# Patient Record
Sex: FEMALE | Race: WHITE | NOT HISPANIC OR LATINO | Employment: FULL TIME | URBAN - METROPOLITAN AREA
[De-identification: names, ages, dates, MRNs, and addresses within clinical notes are randomized per-mention and may not be internally consistent; named-entity substitution may affect disease eponyms.]

---

## 2022-12-12 ENCOUNTER — OFFICE VISIT (OUTPATIENT)
Dept: PSYCHIATRY | Facility: CLINIC | Age: 42
End: 2022-12-12

## 2022-12-12 VITALS
WEIGHT: 248 LBS | DIASTOLIC BLOOD PRESSURE: 76 MMHG | BODY MASS INDEX: 38.92 KG/M2 | SYSTOLIC BLOOD PRESSURE: 148 MMHG | HEIGHT: 67 IN

## 2022-12-12 DIAGNOSIS — F41.1 GAD (GENERALIZED ANXIETY DISORDER): ICD-10-CM

## 2022-12-12 DIAGNOSIS — F13.20 BENZODIAZEPINE DEPENDENCE: ICD-10-CM

## 2022-12-12 DIAGNOSIS — F41.0 PANIC DISORDER: ICD-10-CM

## 2022-12-12 DIAGNOSIS — F33.0 RECURRENT DEPRESSIVE DISORDER, CURRENT EPISODE MILD: Primary | ICD-10-CM

## 2022-12-12 PROCEDURE — 90792 PSYCH DIAG EVAL W/MED SRVCS: CPT | Performed by: PSYCHIATRY & NEUROLOGY

## 2022-12-12 RX ORDER — CLONAZEPAM 1 MG/1
1 TABLET ORAL 2 TIMES DAILY
COMMUNITY
Start: 2022-11-27 | End: 2022-12-12 | Stop reason: SDUPTHER

## 2022-12-12 RX ORDER — SERTRALINE HYDROCHLORIDE 100 MG/1
100 TABLET, FILM COATED ORAL NIGHTLY
Qty: 30 TABLET | Refills: 0 | Status: SHIPPED | OUTPATIENT
Start: 2022-12-12 | End: 2023-01-11

## 2022-12-12 RX ORDER — CLONAZEPAM 1 MG/1
1 TABLET ORAL 2 TIMES DAILY
Qty: 60 TABLET | Refills: 0 | Status: SHIPPED | OUTPATIENT
Start: 2022-12-27 | End: 2022-12-21 | Stop reason: SDUPTHER

## 2022-12-12 RX ORDER — SERTRALINE HYDROCHLORIDE 100 MG/1
TABLET, FILM COATED ORAL
COMMUNITY
Start: 2022-09-21 | End: 2022-12-12 | Stop reason: SDUPTHER

## 2022-12-12 NOTE — PROGRESS NOTES
"[facility]  Initial Evaluation    Referring Provider:  Provider, No Known  [place] [address]    Chief Complaint: \"I just moved here and I was under the care of someone else and they stopped practicing in the three months that I lived here.\"    History of Present Illness: Cherise Roque is a 42 y.o. female who presents today for initial evaluation regarding ongoing anxiety and panic in the setting of iatrogenic benzodiazepine dependence and a recent move to [place] from [place]. She presents unaccompanied in no acute distress and engages with me appropriately. Psychotropic regimen with which patient presents is described as partially effective.     History is positive for signs/symptoms suggestive of MARTA, panic: consistent and excessive worry across several domains of life that contributes to tension and irritability throughout the day, recurrent episodes of intense, unprovoked anxiety with chest pain, feelings of doom, dizziness, paresthesias, SOB, and associated avoidance 2/2 fear of recurrence of panic. Has panic episodes probably a couple of times a day, struggles with being in vehicles as either a  or a passenger. Feels panic disorder led to end in her marriage - this has limited her functionality in several regards, though she has been able to maintain a job; divorce was three years ago. Daughter's recurrent suicidal gestures and attempts have contributed to patient's panic and also have contributed to guilt - once tried to OD on patient's medications left in a purse. Things had been better until recently when her daughter moved in from [place], [place] - she feels the two of them have a toxic relationship and that this has been a big strain on the patient. She had been enjoying her independence and self-reliance without external factors detracting at home and this has changed. The patient's daughter is cycling through jobs and per the patient doesn't " have good work ethic or drive and this is frustrating for the patient. Moved in 09/2022 away from family for work and is proud of herself for doing this and being independent in this fashion.    I have counseled the patient with regard to diagnoses and the recommended treatment regimen as documented below: I will assume prescriptive responsibility for clonazepam and sertraline today. Patient does not take sertraline daily as prescribed and often misses doses; I have counseled her some about this today and she agrees to try and take it more regularly to assess for effect. Patient has been advised that long-term use of clonazepam can foster tachyphylaxis, dependence, and severe/life-threatening withdrawal with abrupt discontinuation - ideally this agent will be tapered and/or discontinued over time with greater stability. Patient has been advised that this agent can contribute to falls, confusion, sedation, and stunted psychological convalescence. We have discussed at length that she would likely benefit most from a combination of medication and therapy, but the patient does remain reluctant to engage a therapist at this time given her past experiences was less than helpful. Patient acknowledges the diagnoses per my rendered interpretation. Patient demonstrates awareness/understanding of viable alternatives for treatment as well as potential risks, benefits, and side effects associated with this regimen and is amenable to proceed in this fashion.     Recommended lifestyle changes: treat panic as a misunderstood child or a troubled friend rather than a monster, demon, or enemy that it can feel to be; begin addressing a need for control and exploring why control became an adaptation for her in life due to past circumstances, and mitigate avoidance behaviors that contribute to the fear-anxiety cycle that has been ongoing for years in her case.    Psychiatric History:  Diagnoses: anxiety, panic, depression  Outpatient  "history: most recently with a Aletha Sehlby at [facility]  Inpatient history: twice; most recently 1998 (panic was debilitating, didn't know what to do - found out she was pregnant)  Medication trials: paroxetine, bupropion, escitalopram, duloxetine, others; no issues with SE in the past, but instead diminished effectiveness  Other treatment modalities: has done therapy in the past  Current regimen: sertraline 100 mg, clonazepam 1 mg BID  Self harm: denies  Suicide attempts: denies    Substance Abuse History:   Types/methods/frequency: substances in high school as below, not since; stopped after discovering she was pregnant    Social History:  Residence: lives in an apartment with her daughter (19 YO) - daughter just recently moved in and there is conflict; patient moved from [place] 09/05/2022 due to work transfer; divorce was three years ago  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[employer]; teacher in the past  Education: AD in early childhood education  Pertinent developmental history: denies; no abuse hx  Pertinent legal history: denies  Hobbies/interests: reading  Hindu: Shinto by ethnicity, doesn't like organized [employer]; has enjoyed sitting in [employer] in the past  Exercise: \"not on purpose\" - on her feet nearly all day at work, walks 7 miles on an average shift  Dietary habits: convenience, often eats junk foods  Sleep hygiene: work is prohibitive to routine  Social habits: no pertinent issues  Sunlight: no concern for under-exposure  Caffeine intake: 3-4 cups of coffee daily  Hydration habits: no pertinent issues   history: denies    Social History     Socioeconomic History   • Marital status:    Tobacco Use   • Smoking status: Every Day     Packs/day: 1.00     Years: 26.00     Pack years: 26.00     Types: Cigarettes   • Smokeless tobacco: Never   Vaping Use   • Vaping Use: Never used   Substance and Sexual Activity   • Alcohol use: Never   • Drug use: Not Currently     Types: Marijuana, " Cocaine(coke), LSD, MDMA (ecstacy), Opium, Psilocybin     Comment: Experimented in high school   • Sexual activity: Yes     Partners: Male     Birth control/protection: None     Access to Firearms: denies    Tobacco use counseling/intervention: patient was counseled with regard to risks of tobacco use and encouraged to consider quitting, with or without the use of adjunctive medication, by first setting a prospective quit date. Currently precontemplative by transtheoretical model.     PHQ-9 Depression Screening  PHQ-9 Total Score: 7    Little interest or pleasure in doing things? 1-->several days   Feeling down, depressed, or hopeless? 1-->several days   Trouble falling or staying asleep, or sleeping too much? 1-->several days   Feeling tired or having little energy? 1-->several days   Poor appetite or overeating? 1-->several days   Feeling bad about yourself - or that you are a failure or have let yourself or your family down? 1-->several days   Trouble concentrating on things, such as reading the newspaper or watching television? 0-->not at all   Moving or speaking so slowly that other people could have noticed? Or the opposite - being so fidgety or restless that you have been moving around a lot more than usual? 0-->not at all   Thoughts that you would be better off dead, or of hurting yourself in some way? 1-->several days   PHQ-9 Total Score 7     MARTA-7  Feeling nervous, anxious or on edge: Several days  Not being able to stop or control worrying: Several days  Worrying too much about different things: Several days  Trouble Relaxing: Several days  Being so restless that it is hard to sit still: Not at all  Feeling afraid as if something awful might happen: Several days  Becoming easily annoyed or irritable: Several days  MARTA 7 Total Score: 6  If you checked any problems, how difficult have these problems made it for you to do your work, take care of things at home, or get along with other people: Somewhat  difficult    Problem List:  Patient Active Problem List   Diagnosis   • Recurrent depressive disorder, current episode mild (HCC)   • MARTA (generalized anxiety disorder)   • Panic disorder     Allergy:   No Known Allergies     Discontinued Medications:  Medications Discontinued During This Encounter   Medication Reason   • clonazePAM (KlonoPIN) 1 MG tablet Reorder   • sertraline (ZOLOFT) 100 MG tablet Reorder       Current Medications:   Current Outpatient Medications   Medication Sig Dispense Refill   • [START ON 12/27/2022] clonazePAM (KlonoPIN) 1 MG tablet Take 1 tablet by mouth 2 (Two) Times a Day for 30 days. 60 tablet 0   • sertraline (ZOLOFT) 100 MG tablet Take 1 tablet by mouth Every Night for 30 days. 30 tablet 0     No current facility-administered medications for this visit.     Past Medical History:  Past Medical History:   Diagnosis Date   • Anxiety 1997   • Depression 1997   • Panic disorder 1997     Past Surgical History:  Past Surgical History:   Procedure Laterality Date   • NO PAST SURGERIES       Family History:   Family History   Problem Relation Age of Onset   • Drug abuse Father    • Alcohol abuse Father    • Alcohol abuse Paternal Aunt    • Alcohol abuse Paternal Uncle    • Paranoid behavior Maternal Grandfather    • Anxiety disorder Maternal Grandfather    • Dementia Maternal Grandmother    • Alcohol abuse Maternal Grandmother    • Alcohol abuse Paternal Grandfather    • Seizures Cousin    • Paranoid behavior Other    • Schizophrenia Other    • OCD Other    • Suicide Attempts Daughter    • Self-Injurious Behavior  Daughter    • Depression Daughter      Mental Status Exam:   Appearance: well-groomed, sits upright, age-appropriate, above average habitus  Behavior: calm, cooperative, appropriate in demeanor, appropriate eye-contact  Mood/affect: fair / mood-congruent and appropriate in both range and amplitude  Speech: within expected variance; appropriate rate, appropriate rhythm, appropriate  "tone; non-pressured  Thought Process: linear, goal-directed; no FOI or JOAN; abstraction intact  Thought Content: coherent, devoid of overt delusions/perceptual disturbances  SI/HI: denies both SI and HI; exhibits future-orientation, self-advocates appropriately, no regular self-harm, no appreciable intent  Memory: no overt deficits  Orientation: oriented to person/place/time/situation  Concentration: appropriate during interview  Intellectual capacity: presumptively average  Insight: fair by given history/exam  Judgment: appropriate by given history/exam  Psychomotor: no appreciable latency/retardation/agitation/tremor  Gait: WNL    Review of Systems:  Review of Systems   Constitutional: Negative for activity change, appetite change and unexpected weight change.   HENT: Negative for drooling.    Eyes: Negative for visual disturbance.   Respiratory: Negative for chest tightness and shortness of breath.    Cardiovascular: Negative for chest pain and palpitations.   Gastrointestinal: Negative for abdominal pain, diarrhea and nausea.   Endocrine: Negative for cold intolerance and heat intolerance.   Genitourinary: Negative for difficulty urinating and frequency.   Musculoskeletal: Negative for myalgias and neck stiffness.   Skin: Negative for rash.   Neurological: Negative for dizziness, tremors, seizures and light-headedness.      Vital Signs:   /76   Ht 168.9 cm (66.5\")   Wt 112 kg (248 lb)   BMI 39.43 kg/m²      Lab Results:   No results found for any previous visit.     EKG Results:  No orders to display     Imaging Results:  No Images in the past 120 days found.    ASSESSMENT AND PLAN:    ICD-10-CM ICD-9-CM   1. Recurrent depressive disorder, current episode mild (HCC)  F33.0 296.31   2. MARTA (generalized anxiety disorder)  F41.1 300.02   3. Panic disorder  F41.0 300.01   4. Benzodiazepine dependence (HCC)  F13.20 304.10     42 y.o. female who presents today for initial evaluation regarding ongoing anxiety " and panic in the setting of iatrogenic benzodiazepine dependence and a recent move to [place] from [place]. We have discussed the history and interpreted diagnoses as above as well as the treatment plan below, including potential R/B/SE of the recommended regimen of which the patient demonstrates understanding. Patient is agreeable to call 911 or go to the nearest ER should she become concerned for her own safety and/or the safety of those around her. There are no overt indices of acute jeanna/psychosis on evaluation today. There are no medication side effects or related complaints today. JOSE reviewed and as expected.    1. Medication regimen: continue clonazepam and sertraline without change for the time being; patient is advised not to misuse prescribed medications or to use any exogenous substances that aren't disclosed to this provider as they may interact with the regimen to her detriment.   2. Risk Assessment: protracted risk is low, imminent risk is low.  Risk factors include: anxiety disorder, mood disorder, and recent/ongoing psychosocial stressors. Protective factors include: intact reality testing, no substance use disorder, no access to firearms, no present SI, no stated history of suicide attempts or self-harm, patient's exhibited future-orientation, strong social support, and patient's cooperation with care. Do note that this is subject to change with the Yarsani of new stressors, treatment non-adherence, use of substances, and/or new medical ails.  3. Monitoring: PHQ-9 today is 7/27, MARTA-7 today is 6/21  4. Therapy: defer 2/2 patient preference  5. Follow-up: 4 weeks  6. Communications: N/A    TREATMENT PLAN/GOALS: challenge patterns of living conducive to symptom burden, implement recommended regimen as above with augmentative, intermittent supportive psychotherapy to reduce symptom burden. Patient acknowledged and verbally consented to begin treatment as above. The importance of adherence to the recommended  treatment and interval follow-up appointments was emphasized today. Patient was today advised to limit daily caffeine intake, hydrate appropriately, eat healthy and nutritious foods, engage sleep hygiene measures, engage appropriate exposure to sunlight, engage with hobbies in balance with life necessities, and exercise appropriate to their capacity to do so.     Billing: I have seen the patient today and considered her psychiatric complaints, rendered a diagnosis, and discussed treatment with the patient as above with which she consents.    Parts of this note are electronic transcriptions/translations of spoken language to printed text using the Dragon Dictation system.    Electronically signed by Wang Osman MD, 12/12/22, 0260

## 2022-12-12 NOTE — TREATMENT PLAN
Multi-Disciplinary Problems (from Behavioral Health Treatment Plan)    Active Problems     Problem: Anxiety  Start Date: 12/12/22    Problem Details: The patient self-scales this problem as a 3 with 10 being the worst.        Goal Priority Start Date Expected End Date End Date    Patient will develop and implement behavioral and cognitive strategies to reduce anxiety and irrational fears. -- 12/12/22 -- --    Goal Details: Progress toward goal:  Not appropriate to rate progress toward goal since this is the initial treatment plan.        Goal Intervention Frequency Start Date End Date    Help patient explore past emotional issues in relation to present anxiety. PRN 12/12/22 --    Intervention Details: Duration of treatment until until remission of symptoms.        Goal Intervention Frequency Start Date End Date    Help patient develop an awareness of their cognitive and physical responses to anxiety. PRN 12/12/22 --    Intervention Details: Duration of treatment until until remission of symptoms.              Problem: Depression  Start Date: 12/12/22    Problem Details: The patient self-scales this problem as a 2 with 10 being the worst.        Goal Priority Start Date Expected End Date End Date    Patient will demonstrate the ability to initiate new constructive life skills outside of sessions on a consistent basis. -- 12/12/22 -- --    Goal Details: Progress toward goal:  Not appropriate to rate progress toward goal since this is the initial treatment plan.        Goal Intervention Frequency Start Date End Date    Assist patient in setting attainable activities of daily living goals. PRN 12/12/22 --    Goal Intervention Frequency Start Date End Date    Provide education about depression PRN 12/12/22 --    Intervention Details: Duration of treatment until until remission of symptoms.        Goal Intervention Frequency Start Date End Date    Assist patient in developing healthy coping strategies. PRN 12/12/22 --     Intervention Details: Duration of treatment until until remission of symptoms.              Problem: Panic Disorder  Start Date: 12/12/22    Problem Details: The patient self-scales this problem as a 6 with 10 being the worst.          Goal Priority Start Date Expected End Date End Date    Reduce fear of the specific stimulus object or situation that previously provoked immediate anxiety. -- 12/12/22 -- --    Goal Details: Progress toward goal:  Not appropriate to rate progress toward goal since this is the initial treatment plan.      Goal Intervention Frequency Start Date End Date    Develop positive, healthy, and rational self talk that reduces fear and allows the behavioral encounter with avoided stimuli. PRN 12/12/22 --    Intervention Details: Duration of treatment until until remission of symptoms.                         I have discussed and reviewed this treatment plan with the patient.

## 2022-12-21 ENCOUNTER — TELEPHONE (OUTPATIENT)
Dept: PSYCHIATRY | Facility: CLINIC | Age: 42
End: 2022-12-21

## 2022-12-21 DIAGNOSIS — F41.0 PANIC DISORDER: ICD-10-CM

## 2022-12-21 DIAGNOSIS — F41.1 GAD (GENERALIZED ANXIETY DISORDER): ICD-10-CM

## 2022-12-21 DIAGNOSIS — F13.20 BENZODIAZEPINE DEPENDENCE: ICD-10-CM

## 2022-12-21 RX ORDER — CLONAZEPAM 1 MG/1
1 TABLET ORAL 2 TIMES DAILY
Qty: 60 TABLET | Refills: 0 | Status: SHIPPED | OUTPATIENT
Start: 2022-12-21 | End: 2023-01-12 | Stop reason: SDUPTHER

## 2022-12-21 NOTE — TELEPHONE ENCOUNTER
PT LEFT VOICEMAIL TO REQUEST EARLY REFILL ON KLONOPIN 1MG.     PT REPORTED SHE HAS ABOUT 6 TABLETS LEFT, HOWEVER SHE IS LEAVING FOR [place] FOR THE HOLIDAYS THIS Friday AND WOULD LIKE TO PICKUP MEDICATION BEFORE SHE LEAVES.     PT HAS UPCOMING APPT ON 01/12/2023.     clonazePAM (KlonoPIN) 1 MG tablet (12/27/2022)    PLEASE REORDER MEDICATION TO PREFERRED PHARMACY IF ABLE.

## 2023-01-11 DIAGNOSIS — F33.0 RECURRENT DEPRESSIVE DISORDER, CURRENT EPISODE MILD: ICD-10-CM

## 2023-01-11 DIAGNOSIS — F41.0 PANIC DISORDER: ICD-10-CM

## 2023-01-11 DIAGNOSIS — F41.1 GAD (GENERALIZED ANXIETY DISORDER): ICD-10-CM

## 2023-01-11 RX ORDER — SERTRALINE HYDROCHLORIDE 100 MG/1
TABLET, FILM COATED ORAL
Qty: 30 TABLET | Refills: 0 | Status: SHIPPED | OUTPATIENT
Start: 2023-01-11 | End: 2023-01-12 | Stop reason: SDUPTHER

## 2023-01-12 ENCOUNTER — OFFICE VISIT (OUTPATIENT)
Dept: PSYCHIATRY | Facility: CLINIC | Age: 43
End: 2023-01-12
Payer: COMMERCIAL

## 2023-01-12 VITALS
SYSTOLIC BLOOD PRESSURE: 144 MMHG | BODY MASS INDEX: 40.02 KG/M2 | HEIGHT: 66 IN | WEIGHT: 249 LBS | DIASTOLIC BLOOD PRESSURE: 81 MMHG

## 2023-01-12 DIAGNOSIS — F17.210 NICOTINE DEPENDENCE, CIGARETTES, UNCOMPLICATED: ICD-10-CM

## 2023-01-12 DIAGNOSIS — F41.0 PANIC DISORDER: ICD-10-CM

## 2023-01-12 DIAGNOSIS — F33.0 RECURRENT DEPRESSIVE DISORDER, CURRENT EPISODE MILD: Primary | ICD-10-CM

## 2023-01-12 DIAGNOSIS — F13.20 BENZODIAZEPINE DEPENDENCE: ICD-10-CM

## 2023-01-12 DIAGNOSIS — F41.1 GAD (GENERALIZED ANXIETY DISORDER): ICD-10-CM

## 2023-01-12 PROCEDURE — 90833 PSYTX W PT W E/M 30 MIN: CPT | Performed by: PSYCHIATRY & NEUROLOGY

## 2023-01-12 PROCEDURE — 99214 OFFICE O/P EST MOD 30 MIN: CPT | Performed by: PSYCHIATRY & NEUROLOGY

## 2023-01-12 RX ORDER — CLONAZEPAM 1 MG/1
1 TABLET ORAL 2 TIMES DAILY
Qty: 60 TABLET | Refills: 1 | Status: SHIPPED | OUTPATIENT
Start: 2023-01-12 | End: 2023-01-17 | Stop reason: SDUPTHER

## 2023-01-12 RX ORDER — SERTRALINE HYDROCHLORIDE 100 MG/1
100 TABLET, FILM COATED ORAL NIGHTLY
Qty: 60 TABLET | Refills: 0 | Status: SHIPPED | OUTPATIENT
Start: 2023-01-12 | End: 2023-03-31 | Stop reason: SDUPTHER

## 2023-01-12 NOTE — PROGRESS NOTES
"[facility]  Follow-up Visit    Chief Complaint: \"I just moved here and I was under the care of someone else and they stopped practicing in the three months that I lived here.\"    History of Present Illness: Cherise Roque is a 42 y.o. female who presents today for follow-up regarding MDD, MARTA, panic, and iatrogenic BZD dependence. Last seen: 12/12 at which time no changes were made to her regimen. She presents unaccompanied in no acute distress and engages with me appropriately. Psychotropic regimen perceived to be partially effective. Side-effects per given history: denies.    Current treatment regimen includes:   - sertraline 100 mg HS  - clonazepam 1 mg BID    Today the patient feels fair. She continues to miss home; Facetime with family is bitter-sweet in this regard, misses food and culture she had in [place]. [place] very [place] it [place] a [place] - patient feels there should be more internal consistency. She's not getting adequate time for breaks, frequently has to close and then open the next day. She's trying to implement changes to the infrastructure (this is why she was recruited to this location from her former), but now is being pressured to \"adapt\" or assimilate. Despite some trouble sleeping, the patient doesn't wish to make changes to her regimen at this time. Really enjoys ASMR as this helps to relax her and quell generalized racing thoughts. Thought process and content are devoid of overt aberration suggestive of acute jeanna/psychosis. The patient denies SI/HI/AVH. There are no overt changes on exam today compared to most recent evaluation.  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 Patient acknowledges the diagnoses per my rendered interpretation. Patient demonstrates understanding of potential risks/benefits/side effects associated with this regimen and is amenable to proceed in this fashion.     Psychotherapy  - Time: 30 minutes  - interventions employed: the therapeutic alliance was strengthened to encourage the patient to express their thoughts and feelings freely. Esteem building was enhanced through praise, reassurance, normalizing/challenging, and encouragement as appropriate. Coping skills were enhanced to build distress tolerance skills and emotional regulation. Allowed patient to freely discuss issues without interruption or judgement with unconditional positive regard, active listening skills, and empathy. Provided a safe, confidential environment to facilitate the development of a positive therapeutic relationship and encourage open, honest communication. Assisted patient in processing session content; acknowledged and normalized/addressed, as appropriate, patient’s thoughts, feelings, and concerns by utilizing a person-centered approach in efforts to build appropriate rapport and a positive therapeutic relationship with open and honest communication.   - Diagnoses: see assessment and plan below  - Symptoms: see subjective above  - Goals   - patient: mitigate anxiety, improve mood sustainably, adjust to new circumstances with zach   - provider: challenge patterns of living conducive to pathology, strengthen defenses, promote  problems solving, restore adaptive functioning and provide symptom relief.  - Treatment plan: continue supportive psychotherapy in subsequent appointments to provide symptom relief; see assessment and plan below for additional details:   - iteration: 1   - progress: partial   - (X)illumination, (X)contextualization, (working)detection, (working)development, (-)elaboration, (-)refinement  - functional status: fair  - mental status exam: as below  - prognosis: good    Psychiatric History:  - Pertinent interval changes: N/A  - Psychotropic medication trials: paroxetine, bupropion, escitalopram, duloxetine, others; no issues with SE in the past, but instead diminished effectiveness  - Key synopsis: no hx SIB or SA    Substance Abuse History:   - Pertinent interval changes: N/A  - Key synopsis: substances in high school as below, not since; stopped after discovering she was pregnant    Social History:  - Pertinent interval changes: as above  - Key synopsis: lives in an apartment with her daughter (19 YO) - daughter just recently moved in and there is conflict; [place] moved from [place] 09[place]05[place]20[place] due to work transfer; divorce was three years ago; manager at [employer]; teacher in the past; AD in early childhood education; Hinduism by ethnicity, doesn't like organized Hoahaoism; has enjoyed sitting in [employer] in the past; walks 7 miles on an average shift; often eats junk foods; 3-4 cups of coffee daily    Social History     Socioeconomic History   • Marital status:    Tobacco Use   • Smoking status: Every Day     Packs/day: 1.00     Years: 26.00     Pack years: 26.00     Types: Cigarettes   • Smokeless tobacco: Never   Vaping Use   • Vaping Use: Never used   Substance and Sexual Activity   • Alcohol use: Never   • Drug use: Not Currently     Types: Marijuana, Cocaine(coke), LSD, MDMA (ecstacy), Opium, Psilocybin     Comment: Experimented in high school   • Sexual activity: Yes     Partners: Male     Birth  control/protection: None     Tobacco use counseling/intervention: patient has been counseled with regard to risks of tobacco use and encouraged to consider quitting, with or without the use of adjunctive medication, by first setting a prospective quit date. Currently precontemplative by transtheoretical model.     PHQ-9 Depression Screening  PHQ-9 Total Score:      Little interest or pleasure in doing things?     Feeling down, depressed, or hopeless?     Trouble falling or staying asleep, or sleeping too much?     Feeling tired or having little energy?     Poor appetite or overeating?     Feeling bad about yourself - or that you are a failure or have let yourself or your family down?     Trouble concentrating on things, such as reading the newspaper or watching television?     Moving or speaking so slowly that other people could have noticed? Or the opposite - being so fidgety or restless that you have been moving around a lot more than usual?     Thoughts that you would be better off dead, or of hurting yourself in some way?     PHQ-9 Total Score       Change in PHQ-9 since last measure: N/A (7)    MARTA-7       Change in MARTA-7 since last measure: N/A (6)    Problem List:  Patient Active Problem List   Diagnosis   • Recurrent depressive disorder, current episode mild (HCC)   • MARTA (generalized anxiety disorder)   • Panic disorder   • Benzodiazepine dependence (HCC)   • Nicotine dependence, cigarettes, uncomplicated     Allergy:   No Known Allergies     Discontinued Medications:  Medications Discontinued During This Encounter   Medication Reason   • clonazePAM (KlonoPIN) 1 MG tablet Reorder   • sertraline (ZOLOFT) 100 MG tablet Reorder       Current Medications:   Current Outpatient Medications   Medication Sig Dispense Refill   • clonazePAM (KlonoPIN) 1 MG tablet Take 1 tablet by mouth 2 (Two) Times a Day. 60 tablet 1   • sertraline (ZOLOFT) 100 MG tablet Take 1 tablet by mouth Every Night. 60 tablet 0     No current  facility-administered medications for this visit.     Past Medical History:  Past Medical History:   Diagnosis Date   • Anxiety 1997   • Depression 1997   • Panic disorder 1997     Past Surgical History:  Past Surgical History:   Procedure Laterality Date   • NO PAST SURGERIES       Mental Status Exam:   Appearance: well-groomed, sits upright, age-appropriate, above average habitus  Behavior: calm, cooperative, appropriate in demeanor, appropriate eye-contact  Mood/affect: fair / mood-congruent and appropriate in both range and amplitude  Speech: within expected variance; appropriate rate, appropriate rhythm, appropriate tone; non-pressured  Thought Process: linear, goal-directed; no FOI or JOAN; abstraction intact  Thought Content: coherent, devoid of overt delusions/perceptual disturbances  SI/HI: denies both SI and HI; exhibits future-orientation, self-advocates appropriately, no regular self-harm, no appreciable intent  Memory: no overt deficits  Orientation: oriented to person/place/time/situation  Concentration: appropriate during interview  Intellectual capacity: presumptively average  Insight: fair by given history/exam  Judgment: appropriate by given history/exam  Psychomotor: no appreciable latency/retardation/agitation/tremor  Gait: WNL    Review of Systems:  Review of Systems   Constitutional: Negative for activity change, appetite change and unexpected weight change.   HENT: Negative for drooling.    Eyes: Negative for visual disturbance.   Respiratory: Negative for chest tightness and shortness of breath.    Cardiovascular: Negative for chest pain and palpitations.   Gastrointestinal: Negative for abdominal pain, diarrhea and nausea.   Endocrine: Negative for cold intolerance and heat intolerance.   Genitourinary: Negative for difficulty urinating and frequency.   Musculoskeletal: Negative for myalgias and neck stiffness.   Skin: Negative for rash.   Neurological: Negative for dizziness, tremors,  "seizures and light-headedness.     Vital Signs:   /81   Ht 167.6 cm (66\")   Wt 113 kg (249 lb)   BMI 40.19 kg/m²      Lab Results:   No results found for any previous visit.     EKG Results:  No orders to display     Imaging Results:  No Images in the past 120 days found.    ASSESSMENT AND PLAN:    ICD-10-CM ICD-9-CM   1. Recurrent depressive disorder, current episode mild (HCC)  F33.0 296.31   2. MARTA (generalized anxiety disorder)  F41.1 300.02   3. Panic disorder  F41.0 300.01   4. Benzodiazepine dependence (HCC)  F13.20 304.10   5. Nicotine dependence, cigarettes, uncomplicated  F17.210 305.1     42 y.o. female who presents today for follow-up regarding MDD, MARTA, panic, and iatrogenic BZD dependence. We have discussed the interval history and the treatment plan below, including potential R/B/SE of the recommended regimen of which the patient demonstrates understanding. Patient is agreeable to call 911 or go to the nearest ER should she become concerned for her own safety and/or the safety of those around her. There are no medication side effects or related complaints today. There are no overt indices of acute jeanna/psychosis on exam today. JOSE reviewed and as expected.    1. Medication regimen: continue clonazepam and sertraline without change for the time being; patient is advised not to misuse prescribed medications or to use them with any exogenous substances that aren't disclosed to this provider as they may interact with the regimen to the patient's detriment.   2. Risk Assessment: protracted risk is low, imminent risk is low - no interval change. Do note that this is subject to change with the Hindu of new stressors, treatment non-adherence, use of substances, and/or new medical ails.   3. Monitoring:N/A  4. Therapy: defer  5. Follow-up: 2 months  6. Communications: N/A    TREATMENT PLAN/GOALS: challenge patterns of living conducive to symptom burden, continue recommended regimen as above with " augmentative, intermittent supportive psychotherapy to reduce symptom burden. Patient acknowledged and verbally consented to continue treatment. The importance of adherence to the recommended treatment and interval follow-up appointments was again emphasized today: patient has good treatment adherence per given history. Patient was today reminded to limit daily caffeine intake, hydrate appropriately, eat healthy and nutritious foods, engage sleep hygiene measures, engage appropriate exposure to sunlight, engage with hobbies in balance with life necessities, and exercise appropriate to their capacity to do so.     Billing: This encounter is of moderate complexity based on number/complexity of problems addressed today and risk of complications/morbidity: 2+ stable chronic illnesses and prescription management. Additionally, I provided 30 minutes of dedicated psychotherapy to the patient as documented above.    Parts of this note are electronic transcriptions/translations of spoken language to printed text using the Dragon Dictation system.    Electronically signed by Wang Osman MD, 01/12/23, [id number]

## 2023-01-17 ENCOUNTER — TELEPHONE (OUTPATIENT)
Dept: PSYCHIATRY | Facility: CLINIC | Age: 43
End: 2023-01-17

## 2023-01-17 ENCOUNTER — TELEPHONE (OUTPATIENT)
Dept: PSYCHIATRY | Facility: CLINIC | Age: 43
End: 2023-01-17
Payer: COMMERCIAL

## 2023-01-17 DIAGNOSIS — F41.1 GAD (GENERALIZED ANXIETY DISORDER): ICD-10-CM

## 2023-01-17 DIAGNOSIS — F13.20 BENZODIAZEPINE DEPENDENCE: ICD-10-CM

## 2023-01-17 DIAGNOSIS — F41.0 PANIC DISORDER: ICD-10-CM

## 2023-01-17 RX ORDER — CLONAZEPAM 1 MG/1
1 TABLET ORAL 2 TIMES DAILY
Qty: 60 TABLET | Refills: 1 | Status: SHIPPED | OUTPATIENT
Start: 2023-01-17 | End: 2023-02-20 | Stop reason: SDUPTHER

## 2023-01-17 NOTE — TELEPHONE ENCOUNTER
Pharmacy called and said that they are reviewing there camera footage to further investigate whether the patient in fact picked up the medication or not on 12/26/22

## 2023-01-17 NOTE — TELEPHONE ENCOUNTER
Patient called answering service - her pharmacy wouldn't dispense her clonazepam because it's ten days prior to the next due script. I have spoken with the patient and the pharmacist on site - they will authorize the script this once. The patient feels it's possible someone has stolen the medication from her belongings at work and will now implement measures to prevent this from happening. She will take only two tablets with her each day and will lock the medicine in a safe at home. I have advised if this happens again or becomes a pattern, her Rx will be restricted and she could run the risk of experiencing withdrawals should that occur. She expresses understanding.

## 2023-01-17 NOTE — TELEPHONE ENCOUNTER
Patient called and said that the pharmacy will not let her  her clonazepam until the 26th of this month.  According to the pharmacy she picked up her last bottle on 12/26/22.  Patient says that she doesn't know when she picked up th elast bottle, but that it could not have been on the 26th because the was out of town.  prescriber wrote last order on 12/21/22 with a note to be filled early due to holiday travel.  Patient is out of medication and does not know what to do.  Please adavise

## 2023-02-13 DIAGNOSIS — F33.0 RECURRENT DEPRESSIVE DISORDER, CURRENT EPISODE MILD: ICD-10-CM

## 2023-02-13 DIAGNOSIS — F41.0 PANIC DISORDER: ICD-10-CM

## 2023-02-13 DIAGNOSIS — F41.1 GAD (GENERALIZED ANXIETY DISORDER): ICD-10-CM

## 2023-02-13 RX ORDER — SERTRALINE HYDROCHLORIDE 25 MG/1
25 TABLET, FILM COATED ORAL DAILY
OUTPATIENT
Start: 2023-02-13

## 2023-02-13 NOTE — TELEPHONE ENCOUNTER
Med Refill Request refused  After review of chart patient should have adequate supply of medication Dr.. Khan sent in 60 day supply of Sertraline on 01/17/2023 patient only takes 1 QHS Please review

## 2023-02-20 DIAGNOSIS — F41.1 GAD (GENERALIZED ANXIETY DISORDER): ICD-10-CM

## 2023-02-20 DIAGNOSIS — F13.20 BENZODIAZEPINE DEPENDENCE: ICD-10-CM

## 2023-02-20 DIAGNOSIS — F41.0 PANIC DISORDER: ICD-10-CM

## 2023-02-20 RX ORDER — CLONAZEPAM 1 MG/1
1 TABLET ORAL 2 TIMES DAILY
Qty: 60 TABLET | Refills: 0 | Status: SHIPPED | OUTPATIENT
Start: 2023-02-20 | End: 2023-03-28 | Stop reason: SDUPTHER

## 2023-02-21 DIAGNOSIS — F41.0 PANIC DISORDER: ICD-10-CM

## 2023-02-21 DIAGNOSIS — F13.20 BENZODIAZEPINE DEPENDENCE: ICD-10-CM

## 2023-02-21 DIAGNOSIS — F41.1 GAD (GENERALIZED ANXIETY DISORDER): ICD-10-CM

## 2023-02-21 RX ORDER — CLONAZEPAM 1 MG/1
TABLET ORAL
Qty: 60 TABLET | OUTPATIENT
Start: 2023-02-21

## 2023-02-21 NOTE — TELEPHONE ENCOUNTER
Rx Controlled Substance Protocol Failed 02/21/2023 12:34 PM   Protocol Details  Urine Toxicology Performed in Last 12 Months    No Active Pregnancy on Record    No Positive Pregnancy Test in Past 12 Months    Recent Appt with me in Past 3 Months    No Benzodiazepines on Active Med List    Medication not refilled in past 28 days        CONTROLLED MEDICATION REFILL REQUEST    STATE REGULATION APPT EVERY 3 MONTHS     UDS(URINE DRUG SCREEN) EVERY 6 MONTHS     NEW NARC CONSENT EVERY YEAR      URINE DRUG SCREEN: NONE IN [facility]     UDS(URINE DRUG SCREEN) PENDED FOR PROVIDER REVIEW    LAST OFFICE VISIT: Office Visit with Wang Osman MD (01/12/2023)      NARC CONSENT: NONE IN [facility]     NEXT OFFICE VISIT: Appointment with Wang Osman MD (03/13/2023)      MEDICATION: clonazePAM (KlonoPIN) 1 MG tablet (02/20/2023)     PROVIDER PLEASE ADVISE

## 2023-02-28 ENCOUNTER — TELEPHONE (OUTPATIENT)
Dept: PSYCHIATRY | Facility: CLINIC | Age: 43
End: 2023-02-28
Payer: COMMERCIAL

## 2023-02-28 NOTE — TELEPHONE ENCOUNTER
ATTEMPTED TO CONTACT PT PER OVERDUE LAB ORDERS PROTOCOL    PT(PATIENT) HAS OVERDUE LAB ORDERS   Urine Drug Screen - Urine, Clean Catch (02/21/2023 17:44)     NO ANSWER      LEFT VOICEMAIL WITH INSTRUCTIONS TO RETURN CALL TO OFFICE AT [phone]

## 2023-03-28 DIAGNOSIS — F41.1 GAD (GENERALIZED ANXIETY DISORDER): ICD-10-CM

## 2023-03-28 DIAGNOSIS — F13.20 BENZODIAZEPINE DEPENDENCE: ICD-10-CM

## 2023-03-28 DIAGNOSIS — F41.0 PANIC DISORDER: ICD-10-CM

## 2023-03-28 RX ORDER — CLONAZEPAM 1 MG/1
1 TABLET ORAL 2 TIMES DAILY
Qty: 60 TABLET | Refills: 0 | Status: SHIPPED | OUTPATIENT
Start: 2023-03-28 | End: 2023-03-31 | Stop reason: SDUPTHER

## 2023-03-28 RX ORDER — CLONAZEPAM 1 MG/1
TABLET ORAL
Qty: 60 TABLET | OUTPATIENT
Start: 2023-03-28

## 2023-03-28 NOTE — TELEPHONE ENCOUNTER
Requested: CLONAZEPAM 1MG TABLETS     NEXT APPT 03/31/2023   LAST REFILL 02/20/2023       Rx Controlled Substance Protocol [id number]/2023 08:52 AM  • Urine Toxicology Performed in Last 12 Months  • No Benzodiazepines on Active Med List  • Medication not refilled in past 28 days  • No Active Pregnancy on Record  • No Positive Pregnancy Test in Past 12 Months  • Recent Appt with me in Past 3 Months

## 2023-03-31 ENCOUNTER — OFFICE VISIT (OUTPATIENT)
Dept: PSYCHIATRY | Facility: CLINIC | Age: 43
End: 2023-03-31
Payer: COMMERCIAL

## 2023-03-31 VITALS
SYSTOLIC BLOOD PRESSURE: 147 MMHG | WEIGHT: 253 LBS | DIASTOLIC BLOOD PRESSURE: 82 MMHG | HEIGHT: 66 IN | HEART RATE: 76 BPM | BODY MASS INDEX: 40.66 KG/M2

## 2023-03-31 DIAGNOSIS — F41.0 PANIC DISORDER: ICD-10-CM

## 2023-03-31 DIAGNOSIS — F41.1 GAD (GENERALIZED ANXIETY DISORDER): ICD-10-CM

## 2023-03-31 DIAGNOSIS — F13.20 BENZODIAZEPINE DEPENDENCE: ICD-10-CM

## 2023-03-31 DIAGNOSIS — F17.210 NICOTINE DEPENDENCE, CIGARETTES, UNCOMPLICATED: ICD-10-CM

## 2023-03-31 DIAGNOSIS — F33.0 RECURRENT DEPRESSIVE DISORDER, CURRENT EPISODE MILD: Primary | ICD-10-CM

## 2023-03-31 PROCEDURE — 99214 OFFICE O/P EST MOD 30 MIN: CPT | Performed by: PSYCHIATRY & NEUROLOGY

## 2023-03-31 PROCEDURE — 90833 PSYTX W PT W E/M 30 MIN: CPT | Performed by: PSYCHIATRY & NEUROLOGY

## 2023-03-31 RX ORDER — CLONAZEPAM 1 MG/1
1 TABLET ORAL 2 TIMES DAILY
Qty: 60 TABLET | Refills: 2 | Status: SHIPPED | OUTPATIENT
Start: 2023-04-27

## 2023-03-31 RX ORDER — SERTRALINE HYDROCHLORIDE 100 MG/1
100 TABLET, FILM COATED ORAL NIGHTLY
Qty: 90 TABLET | Refills: 0 | Status: SHIPPED | OUTPATIENT
Start: 2023-03-31

## 2023-03-31 NOTE — PROGRESS NOTES
"[facility]  Follow-up Visit    Chief Complaint: \"I just moved here and I was under the care of someone else and they stopped practicing in the three months that I lived here.\"    History of Present Illness: Cherise Roque is a 42 y.o. female who presents today for follow-up regarding MDD, MARTA, panic, and iatrogenic BZD dependence. Last seen: 01/12 at which time no changes were made to her regimen. She presents unaccompanied in no acute distress and engages with me appropriately. Psychotropic regimen perceived to be partially effective. Side-effects per given history: denies.    Current treatment regimen includes:   - sertraline 100 mg HS  - clonazepam 1 mg BID    Today the patient feels she's been doing a bit worse - she feels homesick and dislikes her work because she's not performing the functions for which she moved. 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 She missed out on a promotion to a person who has been working with the company for nine months and whom is 21 YO whereas the patient has been working for the company for 10 years. Patient had COVID, had SI with the infection; this was strange for her as it's out of her scope of experience thus far in life. SI has abated with illness, symptoms were mostly cognitive in nature - she experienced insomnia, felt she was slightly out of touch with reality, generalized pain worse with lying down. Had to miss two weeks of work. Denies that SI has continued; notes this was mostly related to her symptoms and a desire to escape COVID, did not feel depressive in nature to the patient. She had the presence of mind to disclose these concerns to her daughter because she was concerned for herself. Today she exhibits future-orientation and appropriate self-advocacy. She's making constructive lifestyle changes and feels these are helping to manage mood and " anxiety; she'd prefer to use these methods to address lingering symptoms in lieu of regimen changes for the time being. Thought process and content are devoid of overt aberration suggestive of acute jeanna/psychosis. The patient denies SI/HI/AVH. There are no overt changes on exam today compared to most recent evaluation.  - contextual changes: missed out on a promotion at work to another employee with less experience and who has been working for the company not nearly as long; step-father is experiencing rejection following heart transplantation (patient was unable to visit to be there for her mother because the patient had COVID), he has recovered - mother was lying to patient to soothe her about her step-father's condition; found out cousin has stage III cancer; discovered DOUG passed away within four weeks related to advanced cancer diagnosis that was caught late; feels she should visit [place] to remind herself why she left in the first place, suspects her homesickness is over-inflating her thinking with regard to moving back home; son didn't renew contract in [place] and will move back to [place] (this is upsetting because the patient loved being closer to him and seeing him more often); has joined the gym and feels this has helped to manage mood and anxiety; has been getting into the habit of grooming and using makeup for work, getting nails done in order to feel better  - sleep: variable, sometimes feels she's napping on and off throughout the night  - appetite: changing her diet to feel better and healthier    I have counseled the patient with regard to diagnoses and the recommended treatment regimen as documented below: she'd like to leave her regimen unchanged for today. Patient acknowledges the diagnoses per my rendered interpretation. Patient demonstrates understanding of potential risks/benefits/side effects associated with this regimen and is amenable to proceed in this fashion.     Psychotherapy  - Time: 32  minutes  - interventions employed: the therapeutic alliance was strengthened to encourage the patient to express their thoughts and feelings freely. Esteem building was enhanced through praise, reassurance, normalizing/challenging, and encouragement as appropriate. Coping skills were enhanced to build distress tolerance skills and emotional regulation. Allowed patient to freely discuss issues without interruption or judgement with unconditional positive regard, active listening skills, and empathy. Provided a safe, confidential environment to facilitate the development of a positive therapeutic relationship and encourage open, honest communication. Assisted patient in processing session content; acknowledged and normalized/addressed, as appropriate, patient’s thoughts, feelings, and concerns by utilizing a person-centered approach in efforts to build appropriate rapport and a positive therapeutic relationship with open and honest communication.   - Diagnoses: see assessment and plan below  - Symptoms: see subjective above  - Goals   - patient: mitigate anxiety, improve mood sustainably, adjust to new circumstances with zach   - provider: challenge patterns of living conducive to pathology, strengthen defenses, promote problems solving, restore adaptive functioning and provide symptom relief.  - Treatment plan: continue supportive psychotherapy in subsequent appointments to provide symptom relief; see assessment and plan below for additional details:   - iteration: 1   - progress: partial   - (X)illumination, (X)contextualization, (working)detection, (working)development, (-)elaboration, (-)refinement  - functional status: fair  - mental status exam: as below  - prognosis: good    Psychiatric History:  - Pertinent interval changes: N/A  - Psychotropic medication trials: paroxetine, bupropion, escitalopram, duloxetine, others; no issues with SE in the past, but instead diminished effectiveness  - Key synopsis: no hx  SIB or SA    Substance Abuse History:   - Pertinent interval changes: N/A  - Key synopsis: substances in high school as below, not since; stopped after discovering she was pregnant    Social History:  - Pertinent interval changes: as above  - Key synopsis: lives in an apartment with her daughter (17 YO) - daughter just recently moved in and there is conflict; patient moved from [place] 09/05/2022 due to work transfer; divorce was three years ago; manager at [employer]; teacher in the past; AD in early childhood education; Yazidi by ethnicity, doesn't like organized Jew; has enjoyed sitting in [employer] in the past; walks 7 miles on an average shift; often eats junk foods; 3-4 cups of coffee daily    Social History     Socioeconomic History   • Marital status:    Tobacco Use   • Smoking status: Every Day     Packs/day: 1.00     Years: 26.00     Pack years: 26.00     Types: Cigarettes   • Smokeless tobacco: Never   Vaping Use   • Vaping Use: Never used   Substance and Sexual Activity   • Alcohol use: Never   • Drug use: Not Currently     Types: Marijuana, Cocaine(coke), LSD, MDMA (ecstacy), Opium, Psilocybin     Comment: Experimented in high school   • Sexual activity: Yes     Partners: Male     Birth control/protection: None     Tobacco use counseling/intervention: patient has been counseled with regard to risks of tobacco use and encouraged to consider quitting, with or without the use of adjunctive medication, by first setting a prospective quit date. Currently precontemplative by transtheoretical model.     PHQ-9 Depression Screening  PHQ-9 Total Score:      Little interest or pleasure in doing things?     Feeling down, depressed, or hopeless?     Trouble falling or staying asleep, or sleeping too much?     Feeling tired or having little energy?     Poor appetite or overeating?     Feeling bad about yourself - or that you are a failure or have let yourself or your family down?     Trouble  concentrating on things, such as reading the newspaper or watching television?     Moving or speaking so slowly that other people could have noticed? Or the opposite - being so fidgety or restless that you have been moving around a lot more than usual?     Thoughts that you would be better off dead, or of hurting yourself in some way?     PHQ-9 Total Score       Change in PHQ-9 since last measure: N/A (7)    MARTA-7       Change in MARTA-7 since last measure: N/A (6)    Problem List:  Patient Active Problem List   Diagnosis   • Recurrent depressive disorder, current episode mild (HCC)   • MARTA (generalized anxiety disorder)   • Panic disorder   • Benzodiazepine dependence (HCC)   • Nicotine dependence, cigarettes, uncomplicated     Allergy:   No Known Allergies     Discontinued Medications:  There are no discontinued medications.    Current Medications:   Current Outpatient Medications   Medication Sig Dispense Refill   • clonazePAM (KlonoPIN) 1 MG tablet Take 1 tablet by mouth 2 (Two) Times a Day. 60 tablet 0   • sertraline (ZOLOFT) 100 MG tablet Take 1 tablet by mouth Every Night. 60 tablet 0     No current facility-administered medications for this visit.     Past Medical History:  Past Medical History:   Diagnosis Date   • Anxiety 1997   • Depression 1997   • Panic disorder 1997     Past Surgical History:  Past Surgical History:   Procedure Laterality Date   • NO PAST SURGERIES       Mental Status Exam:   Appearance: well-groomed, sits upright, age-appropriate, above average habitus  Behavior: calm, cooperative, appropriate in demeanor, appropriate eye-contact  Mood/affect: fair / mood-congruent and appropriate in both range and amplitude  Speech: within expected variance; appropriate rate, appropriate rhythm, appropriate tone; non-pressured  Thought Process: linear, goal-directed; no FOI or JOAN; abstraction intact  Thought Content: coherent, devoid of overt delusions/perceptual disturbances  SI/HI: denies both SI  "and HI; exhibits future-orientation, self-advocates appropriately, no regular self-harm, no appreciable intent  Memory: no overt deficits  Orientation: oriented to person/place/time/situation  Concentration: appropriate during interview  Intellectual capacity: presumptively average  Insight: fair by given history/exam  Judgment: appropriate by given history/exam  Psychomotor: no appreciable latency/retardation/agitation/tremor  Gait: WNL    Review of Systems:  Review of Systems   Constitutional: Negative for activity change, appetite change and unexpected weight change.   HENT: Negative for drooling.    Eyes: Negative for visual disturbance.   Respiratory: Negative for chest tightness and shortness of breath.    Cardiovascular: Negative for chest pain and palpitations.   Gastrointestinal: Negative for abdominal pain, diarrhea and nausea.   Endocrine: Negative for cold intolerance and heat intolerance.   Genitourinary: Negative for difficulty urinating and frequency.   Musculoskeletal: Negative for myalgias and neck stiffness.   Skin: Negative for rash.   Neurological: Negative for dizziness, tremors, seizures and light-headedness.     Vital Signs:   /82   Pulse 76   Ht 167.6 cm (66\")   Wt 115 kg (253 lb)   BMI 40.84 kg/m²      Lab Results:   No results found for any previous visit.     EKG Results:  No orders to display     Imaging Results:  No Images in the past 120 days found.    ASSESSMENT AND PLAN:    ICD-10-CM ICD-9-CM   1. Recurrent depressive disorder, current episode mild (HCC)  F33.0 296.31   2. MARTA (generalized anxiety disorder)  F41.1 300.02   3. Panic disorder  F41.0 300.01   4. Benzodiazepine dependence (HCC)  F13.20 304.10   5. Nicotine dependence, cigarettes, uncomplicated  F17.210 305.1     42 y.o. female who presents today for follow-up regarding MDD, MARTA, panic, and iatrogenic BZD dependence. We have discussed the interval history and the treatment plan below, including potential R/B/SE " of the recommended regimen of which the patient demonstrates understanding. Patient is agreeable to call 911 or go to the nearest ER should she become concerned for her own safety and/or the safety of those around her. There are no medication side effects or related complaints today. There are no overt indices of acute jeanna/psychosis on exam today. JOSE reviewed and as expected.    1. Medication regimen: continue clonazepam and sertraline without change for the time being; patient is advised not to misuse prescribed medications or to use them with any exogenous substances that aren't disclosed to this provider as they may interact with the regimen to the patient's detriment.   2. Risk Assessment: protracted risk is low, imminent risk is low - no interval change. Do note that this is subject to change with the [employer] of new stressors, treatment non-adherence, use of substances, and/or new medical ails.   3. Monitoring:N/A  4. Therapy: defer  5. Follow-up: 3 months  6. Communications: N/A    TREATMENT PLAN/GOALS: challenge patterns of living conducive to symptom burden, continue recommended regimen as above with augmentative, intermittent supportive psychotherapy to reduce symptom burden. Patient acknowledged and verbally consented to continue treatment. The importance of adherence to the recommended treatment and interval follow-up appointments was again emphasized today: patient has good treatment adherence per given history. Patient was today reminded to limit daily caffeine intake, hydrate appropriately, eat healthy and nutritious foods, engage sleep hygiene measures, engage appropriate exposure to sunlight, engage with hobbies in balance with life necessities, and exercise appropriate to their capacity to do so.     Billing: This encounter is of moderate complexity based on number/complexity of problems addressed today and risk of complications/morbidity: 2+ stable chronic illnesses and prescription  management. Additionally, I provided 32 minutes of dedicated psychotherapy to the patient as documented above.    Parts of this note are electronic transcriptions/translations of spoken language to printed text using the Dragon Dictation system.    Electronically signed by Wang Osman MD, 03/31/23, [id number]

## 2023-05-01 DIAGNOSIS — F41.0 PANIC DISORDER: ICD-10-CM

## 2023-05-01 DIAGNOSIS — F41.1 GAD (GENERALIZED ANXIETY DISORDER): ICD-10-CM

## 2023-05-01 DIAGNOSIS — F13.20 BENZODIAZEPINE DEPENDENCE: ICD-10-CM

## 2023-05-01 RX ORDER — CLONAZEPAM 1 MG/1
TABLET ORAL
Qty: 60 TABLET | OUTPATIENT
Start: 2023-05-01

## 2023-05-01 NOTE — TELEPHONE ENCOUNTER
Pt request refill for CLONAZEPAM 1 MG TABLET.    PT HAS REFILLS REMAINING ON CURRENT RX.     RX REFUSED AND PENDING.    clonazePAM (KlonoPIN) 1 MG tablet (04/27/2023)

## 2023-08-14 DIAGNOSIS — F41.1 GAD (GENERALIZED ANXIETY DISORDER): ICD-10-CM

## 2023-08-14 DIAGNOSIS — F41.0 PANIC DISORDER: ICD-10-CM

## 2023-08-14 DIAGNOSIS — F33.0 RECURRENT DEPRESSIVE DISORDER, CURRENT EPISODE MILD: ICD-10-CM

## 2023-08-14 RX ORDER — SERTRALINE HYDROCHLORIDE 100 MG/1
100 TABLET, FILM COATED ORAL NIGHTLY
Qty: 90 TABLET | Refills: 0 | OUTPATIENT
Start: 2023-08-14

## 2023-08-14 RX ORDER — SERTRALINE HYDROCHLORIDE 100 MG/1
100 TABLET, FILM COATED ORAL NIGHTLY
Qty: 90 TABLET | Refills: 0 | Status: SHIPPED | OUTPATIENT
Start: 2023-08-14

## 2023-10-05 ENCOUNTER — OFFICE VISIT (OUTPATIENT)
Dept: PSYCHIATRY | Facility: CLINIC | Age: 43
End: 2023-10-05
Payer: COMMERCIAL

## 2023-10-05 VITALS
DIASTOLIC BLOOD PRESSURE: 85 MMHG | SYSTOLIC BLOOD PRESSURE: 148 MMHG | BODY MASS INDEX: 41.43 KG/M2 | WEIGHT: 257.8 LBS | HEIGHT: 66 IN | HEART RATE: 89 BPM

## 2023-10-05 DIAGNOSIS — F13.20 BENZODIAZEPINE DEPENDENCE: ICD-10-CM

## 2023-10-05 DIAGNOSIS — F33.0 RECURRENT DEPRESSIVE DISORDER, CURRENT EPISODE MILD: ICD-10-CM

## 2023-10-05 DIAGNOSIS — F41.0 PANIC DISORDER: ICD-10-CM

## 2023-10-05 DIAGNOSIS — F41.1 GAD (GENERALIZED ANXIETY DISORDER): Primary | ICD-10-CM

## 2023-10-05 NOTE — PROGRESS NOTES
"[facility]  Follow-up Visit    Chief Complaint: \"I just moved here and I was under the care of someone else and they stopped practicing in the three months that I lived here.\"    History of Present Illness: Cherise Roque is a 43 y.o. female who presents today for follow-up regarding MDD, MARTA, panic, and iatrogenic BZD dependence. Last seen: 06/30 at which time no changes were made to her regimen. She presents unaccompanied in no acute distress and engages with me appropriately. Psychotropic regimen perceived to be partially effective. Side-effects per given history: denies.    Current treatment regimen includes:   - sertraline 100 mg HS  - clonazepam 1 mg BID    Today she reports she spent some time in [place] that was emotionally conflicted - on one hand she was sad to come back to [place], on the other she was relieved she doesn't live there any longer. Work has been okay. She's trying to foster the attitude that she will make the best of the situation despite reluctance on the company's part to make changes with regard to procedures and rules; she feels they're too lenient with people missing work and getting write-ups and selecting employees in an non-judicious way. She doesn't like how \"messy\" this can feel, resents that corporate policy is generally ignored; part of this is because her pay is contingent on sales and productivity. Mother had a breast biopsy and one is suspicious for malignancy; she's 63 and generally in very good health - she kept this from the patient, whom found out recently. Results will be more clear today, patient is awaiting call. Most salient stress: her ex- has stopped paying maintenance fees, is now in contempt of court; she feels ambivalent about escalating the matter, but is prompting him to pay and he is resisting because he believes she is making more money than he is. He is living in an expensive home with nice amenities, frequently " travelling across the world; he  into money. Recently gave her daughter her car and bought a new one thinking she could count on her maintenance payments; her daughter has been disparaging her for the issues between the patient and her ex (the daughter's dad) despite having been given the car. Thought process and content are devoid of overt aberration suggestive of acute jeanna/psychosis. The patient denies SI/HI/AVH. There are no overt changes on exam today compared to most recent evaluation.  - contextual changes: spent time with family/friends in [place] (tires were slashed and had to have her car towed, lost day with family and ended up staying an extra day to get more time in with her friends, traffic caused 12 hour trip back to [place] on way home)  - sleep: variable, sometimes feels she's napping on and off throughout the night  - appetite: changing her diet to feel better and healthier    I have counseled the patient with regard to diagnoses and the recommended treatment regimen as documented below: she'd like to leave her regimen unchanged for today. Patient acknowledges the diagnoses per my rendered interpretation. Patient demonstrates understanding of potential risks/benefits/side effects associated with this regimen and is amenable to proceed in this fashion.     Psychotherapy  - Time: 22 minutes  - interventions employed: the therapeutic alliance was strengthened to encourage the patient to express their thoughts and feelings freely. Esteem building was enhanced through praise, reassurance, normalizing/challenging, and encouragement as appropriate. Coping skills were enhanced to build distress tolerance skills and emotional regulation. Allowed patient to freely discuss issues without interruption or judgement with unconditional positive regard, active listening skills, and empathy. Provided a safe, confidential environment to facilitate the development of a positive therapeutic relationship and encourage  open, honest communication. Assisted patient in processing session content; acknowledged and normalized/addressed, as appropriate, patient’s thoughts, feelings, and concerns by utilizing a person-centered approach in efforts to build appropriate rapport and a positive therapeutic relationship with open and honest communication.   - Diagnoses: see assessment and plan below  - Symptoms: see subjective above  - Goals   - patient: mitigate anxiety, improve mood sustainably, adjust to new circumstances with zach   - provider: challenge patterns of living conducive to pathology, strengthen defenses, promote problems solving, restore adaptive functioning and provide symptom relief.  - Treatment plan: continue supportive psychotherapy in subsequent appointments to provide symptom relief; see assessment and plan below for additional details:   - iteration: 1   - progress: partial   - (X)illumination, (X)contextualization, (working)detection, (working)development, (-)elaboration, (-)refinement  - functional status: fair  - mental status exam: as below  - prognosis: good    Psychiatric History:  - Pertinent interval changes: N/A  - Psychotropic medication trials: paroxetine, bupropion, escitalopram, duloxetine, others; no issues with SE in the past, but instead diminished effectiveness  - Key synopsis: no hx SIB or SA    Substance Abuse History:   - Pertinent interval changes: N/A  - Key synopsis: substances in high school as below, not since; stopped after discovering she was pregnant    Social History:  - Pertinent interval changes: as above  - Key synopsis: lives in an apartment with her daughter (17 YO) - daughter just recently moved in and there is conflict; [place] moved from [place] 09/05/2022 due to work transfer; divorce was three years ago; manager at [employer]; teacher in the past; AD in early childhood education; [place] by ethnicity, doesn't like organized Sikh; has enjoyed sitting in [place] in the past;  walks 7 miles on an average shift; often eats junk foods; 3-4 cups of coffee daily    Social History     Socioeconomic History    Marital status:    Tobacco Use    Smoking status: Every Day     Packs/day: 1.00     Years: 26.00     Pack years: 26.00     Types: Cigarettes    Smokeless tobacco: Never   Vaping Use    Vaping Use: Never used   Substance and Sexual Activity    Alcohol use: Never    Drug use: Not Currently     Types: Marijuana, Cocaine(coke), LSD, MDMA (ecstacy), Opium, Psilocybin     Comment: Experimented in high school    Sexual activity: Yes     Partners: Male     Birth control/protection: None     Tobacco use counseling/intervention: patient has been counseled with regard to risks of tobacco use and encouraged to consider quitting, with or without the use of adjunctive medication, by first setting a prospective quit date. Currently precontemplative by transtheoretical model.     PHQ-9 Depression Screening  PHQ-9 Total Score:      Little interest or pleasure in doing things?     Feeling down, depressed, or hopeless?     Trouble falling or staying asleep, or sleeping too much?     Feeling tired or having little energy?     Poor appetite or overeating?     Feeling bad about yourself - or that you are a failure or have let yourself or your family down?     Trouble concentrating on things, such as reading the newspaper or watching television?     Moving or speaking so slowly that other people could have noticed? Or the opposite - being so fidgety or restless that you have been moving around a lot more than usual?     Thoughts that you would be better off dead, or of hurting yourself in some way?     PHQ-9 Total Score       Change in PHQ-9 since last measure: +8 (7)    MARTA-7       Change in MARTA-7 since last measure: +1 (6)    Problem List:  Patient Active Problem List   Diagnosis    Recurrent depressive disorder, current episode mild    MARTA (generalized anxiety disorder)    Panic disorder     Benzodiazepine dependence    Nicotine dependence, cigarettes, uncomplicated     Allergy:   No Known Allergies     Discontinued Medications:  There are no discontinued medications.      Current Medications:   Current Outpatient Medications   Medication Sig Dispense Refill    clonazePAM (KlonoPIN) 1 MG tablet Take 1 tablet by mouth 2 (Two) Times a Day. 60 tablet 2    sertraline (ZOLOFT) 100 MG tablet TAKE 1 TABLET BY MOUTH EVERY NIGHT 90 tablet 0     No current facility-administered medications for this visit.     Past Medical History:  Past Medical History:   Diagnosis Date    Anxiety 1997    Depression 1997    Panic disorder 1997     Past Surgical History:  Past Surgical History:   Procedure Laterality Date    NO PAST SURGERIES       Mental Status Exam:   Appearance: well-groomed, sits upright, age-appropriate, above average habitus  Behavior: calm, cooperative, appropriate in demeanor, appropriate eye-contact  Mood/affect: fair / mood-congruent and appropriate in both range and amplitude  Speech: within expected variance; appropriate rate, appropriate rhythm, appropriate tone; non-pressured  Thought Process: linear, goal-directed; no FOI or JOAN; abstraction intact  Thought Content: coherent, devoid of overt delusions/perceptual disturbances  SI/HI: denies both SI and HI; exhibits future-orientation, self-advocates appropriately, no regular self-harm, no appreciable intent  Memory: no overt deficits  Orientation: oriented to person/place/time/situation  Concentration: appropriate during interview  Intellectual capacity: presumptively average  Insight: fair by given history/exam  Judgment: appropriate by given history/exam  Psychomotor: no appreciable latency/retardation/agitation/tremor  Gait: WNL    Review of Systems:  Review of Systems   Constitutional:  Negative for activity change, appetite change and unexpected weight change.   HENT:  Negative for drooling.    Eyes:  Negative for visual disturbance.  "  Respiratory:  Negative for chest tightness and shortness of breath.    Cardiovascular:  Negative for chest pain and palpitations.   Gastrointestinal:  Negative for abdominal pain, diarrhea, nausea and vomiting.   Endocrine: Negative for cold intolerance and heat intolerance.   Genitourinary:  Negative for difficulty urinating and frequency.   Musculoskeletal:  Negative for myalgias and neck stiffness.   Skin:  Negative for rash.   Neurological:  Negative for dizziness, tremors, seizures and light-headedness.     Vital Signs:   /85   Pulse 89   Ht 167.6 cm (66\")   Wt 117 kg (257 lb 12.8 oz)   BMI 41.61 kg/m²      Lab Results:   No visits with results within 6 Month(s) from this visit.   Latest known visit with results is:   No results found for any previous visit.     EKG Results:  No orders to display     Imaging Results:  No Images in the past 120 days found.    ASSESSMENT AND PLAN:    ICD-10-CM ICD-9-CM   1. MARTA (generalized anxiety disorder)  F41.1 300.02   2. Recurrent depressive disorder, current episode mild  F33.0 296.31   3. Panic disorder  F41.0 300.01   4. Benzodiazepine dependence  F13.20 304.10     43 y.o. female who presents today for follow-up regarding MDD, MARTA, panic, and iatrogenic BZD dependence. We have discussed the interval history and the treatment plan below, including potential R/B/SE of the recommended regimen of which the patient demonstrates understanding. Patient is agreeable to call 911 or go to the nearest ER should she become concerned for her own safety and/or the safety of those around her. There are no medication side effects or related complaints today. There are no overt indices of acute jeanna/psychosis on exam today. JOSE reviewed and as expected.    Medication regimen: continue clonazepam and sertraline; patient is advised not to misuse prescribed medications or to use them with any exogenous substances that aren't disclosed to this provider as they may interact " with the regimen to the patient's detriment.   Risk Assessment: protracted risk is low, imminent risk is low - no interval change. Do note that this is subject to change with the Baptism of new stressors, treatment non-adherence, use of substances, and/or new medical ails.   Monitoring: PHQ-9 and MARTA-7 a bit worse today  Therapy: defer  Follow-up: 3 months  Communications: N/A    TREATMENT PLAN/GOALS: challenge patterns of living conducive to symptom burden, continue recommended regimen as above with augmentative, intermittent supportive psychotherapy to reduce symptom burden. Patient acknowledged and verbally consented to continue treatment. The importance of adherence to the recommended treatment and interval follow-up appointments was again emphasized today: patient has good treatment adherence per given history. Patient was today reminded to limit daily caffeine intake, hydrate appropriately, eat healthy and nutritious foods, engage sleep hygiene measures, engage appropriate exposure to sunlight, engage with hobbies in balance with life necessities, and exercise appropriate to their capacity to do so.     Billing: This encounter is of moderate complexity based on number/complexity of problems addressed today and risk of complications/morbidity: 2+ stable chronic illnesses and prescription management. Additionally, I provided 22 minutes of dedicated psychotherapy to the patient as documented above.    Parts of this note are electronic transcriptions/translations of spoken language to printed text using the Dragon Dictation system.    Electronically signed by Wang Osman MD, 10/05/23, 3350

## 2023-11-22 DIAGNOSIS — F13.20 BENZODIAZEPINE DEPENDENCE: ICD-10-CM

## 2023-11-22 DIAGNOSIS — F41.0 PANIC DISORDER: ICD-10-CM

## 2023-11-22 DIAGNOSIS — F41.1 GAD (GENERALIZED ANXIETY DISORDER): ICD-10-CM

## 2023-11-22 RX ORDER — CLONAZEPAM 1 MG/1
1 TABLET ORAL 2 TIMES DAILY
Qty: 60 TABLET | OUTPATIENT
Start: 2023-11-22

## 2023-11-22 RX ORDER — CLONAZEPAM 1 MG/1
1 TABLET ORAL 2 TIMES DAILY
Qty: 60 TABLET | Refills: 0 | Status: SHIPPED | OUTPATIENT
Start: 2023-11-22

## 2023-11-22 NOTE — TELEPHONE ENCOUNTER
PT(PATIENT) VERIFIED     clonazePAM (KlonoPIN) 1 MG tablet (2023)     PT(PATIENT) ADVISED MEDICATION WAS SENT IN FOR      PT(PATIENT) VERBALIZED UNDERSTANDING AND HAD NO FURTHER QUESTIONS AT THIS TIME

## 2023-11-22 NOTE — TELEPHONE ENCOUNTER
DUPLICATE RENEWAL REQUEST     PLEASE REVIEW   Refill with Wang Osman MD (11/22/2023)     PROVIDER PLEASE ADVISE

## 2023-11-22 NOTE — TELEPHONE ENCOUNTER
NEXT VISIT WITH PROVIDER   Appointment with Wang Osman MD (01/05/2024)     LAST SEEN BY PROVIDER   Office Visit with Wang Osman MD (10/05/2023)     LAST MED REFILL  clonazePAM (KlonoPIN) 1 MG tablet (08/01/2023)     PROVIDER PLEASE ADVISE

## 2023-11-22 NOTE — TELEPHONE ENCOUNTER
PT(PATIENT) VERIFIED     PT(PATIENT) CALLED TO REQUEST AN UPDATE ON MEDICATION REFILL REQUEST     PT(PATIENT) ADVISED PHARMACY JUST SENT REQUEST TODAY  PM     PT(PATIENT) CONFIRMED INFORMATION WAS CORRECT     PT(PATIENT) STATES SHE IS COMPLETELY OUT OF MEDICATION AT THIS POIN AND WOULD LIKE TO HOPEFULLY GET A REFILL BEFORE THE HOLIDAY     PT(PATIENT) VERBALIZED UNDERSTANDING AND HAD NO FURTHER QUESTIONS AT THIS TIME

## 2023-12-26 DIAGNOSIS — Z51.81 THERAPEUTIC DRUG MONITORING: Primary | ICD-10-CM

## 2023-12-26 DIAGNOSIS — F41.1 GAD (GENERALIZED ANXIETY DISORDER): ICD-10-CM

## 2023-12-26 DIAGNOSIS — F13.20 BENZODIAZEPINE DEPENDENCE: ICD-10-CM

## 2023-12-26 DIAGNOSIS — F41.0 PANIC DISORDER: ICD-10-CM

## 2023-12-26 RX ORDER — CLONAZEPAM 1 MG/1
1 TABLET ORAL 2 TIMES DAILY
Qty: 60 TABLET | Refills: 1 | Status: SHIPPED | OUTPATIENT
Start: 2023-12-26

## 2023-12-26 NOTE — TELEPHONE ENCOUNTER
CONTROLLED MEDICATION REFILL REQUEST    STATE REGULATION APPT EVERY 3 MONTHS     UDS(URINE DRUG SCREEN) EVERY 6 MONTHS OR UP TO PROVIDER PREFERENCE      NEW NARC CONSENT EVERY YEAR      MEDICATION: clonazePAM (KlonoPIN) 1 MG tablet (11/22/2023)      NEXT OFFICE VISIT: Appointment with Wang Osman MD (01/05/2024)     LAST OFFICE VISIT: Office Visit with Wang Osman MD (10/05/2023)     NARC CONSENT: NONE IN EPIC    URINE DRUG SCREEN(STANDING ORDER): UDS(URINE DRUG SCREEN) PENDED FOR PROVIDER REVIEW     PROVIDER PLEASE ADVISE

## 2024-01-05 ENCOUNTER — TELEMEDICINE (OUTPATIENT)
Dept: PSYCHIATRY | Facility: CLINIC | Age: 44
End: 2024-01-05
Payer: COMMERCIAL

## 2024-01-05 DIAGNOSIS — F33.41 RECURRENT MAJOR DEPRESSIVE DISORDER, IN PARTIAL REMISSION: ICD-10-CM

## 2024-01-05 DIAGNOSIS — F13.20 BENZODIAZEPINE DEPENDENCE: ICD-10-CM

## 2024-01-05 DIAGNOSIS — F41.0 PANIC DISORDER: ICD-10-CM

## 2024-01-05 DIAGNOSIS — F41.1 GAD (GENERALIZED ANXIETY DISORDER): Primary | ICD-10-CM

## 2024-01-05 RX ORDER — CLONAZEPAM 1 MG/1
1 TABLET ORAL 2 TIMES DAILY
Qty: 60 TABLET | Refills: 2 | Status: SHIPPED | OUTPATIENT
Start: 2024-01-25

## 2024-01-05 RX ORDER — SERTRALINE HYDROCHLORIDE 100 MG/1
100 TABLET, FILM COATED ORAL NIGHTLY
Qty: 90 TABLET | Refills: 0 | Status: SHIPPED | OUTPATIENT
Start: 2024-01-05

## 2024-01-05 NOTE — PROGRESS NOTES
"[facility]  Follow-up Visit    Chief Complaint: \"I just moved here and I was under the care of someone else and they stopped practicing in the three months that I lived here.\"    History of Present Illness: Cherise Roque is a 43 y.o. female who presents today for follow-up regarding MDD, MARTA, panic, and iatrogenic BZD dependence. Last seen: 10/05 at which time no changes were made to her regimen. Services today rendered via telehealth ([employer]) for which the patient provided informed consent. Patient is aware she can refuse to be seen remotely at any time. Patient was located in a secure, remote environment (at home) as was the provider (in-office). I confirmed the patient's identity prior to evaluation and reiterated my credentials; there were no technical issues during the session today. She presents unaccompanied in no acute distress and engages with me appropriately. Psychotropic regimen perceived to be partially effective. Side-effects per given history: denies.    Current treatment regimen includes:   - sertraline 100 mg HS  - clonazepam 1 mg BID    Today she reports she's feeling a bit ill, which was her reason for changing to a virtual visit. Mother's health issues have been a salient stress; discovered she was +BRCA gene and patient now needing to test for this as well. Sister had ovarian cancer in her 30's and she's concerned this may be a culprit in this case. Her mother has been leaning on humor as a coping mechanism and has been taking this stress in stride, which has been inspiring for the patient. There are no more signs of malignancy currently per scans, but mother will likely be getting a full hysterectomy. Depression symptoms are adequately managed with current interventions. Daughter is now being treated for mental health issues and has seen some benefit, but there is some anxiety about this for the patient as she hopes her daughter doesn't lose some of the " spark in her personality. Anxiety symptoms are fairly managed with current interventions; these have been exacerbated in recent time with the Orthod[employer] of stress as above. Panic symptoms are fairly managed with current interventions. Thought process and content are devoid of overt aberration suggestive of acute jeanna/psychosis. The patient denies SI/HI/AVH. There are no overt changes on exam today compared to most recent evaluation.  - contextual changes: [place] (found out mother has breast cancer, had lumpectomy, later double mastectomy and further surgery); family visiting patient in [place] this afternoon and she's excited, but also very disappointed she's not feeling physically well  - sleep: variable, sometimes feels she's napping on and off throughout the night  - appetite: changing her diet to feel better and healthier    I have counseled the patient with regard to diagnoses and the recommended treatment regimen as documented below. Patient acknowledges the diagnoses per my rendered interpretation. Patient demonstrates understanding of potential risks/benefits/side effects associated with this regimen and is amenable to proceed in this fashion.     Psychotherapy  - Time: 25 minutes  - interventions employed: the therapeutic alliance was strengthened to encourage the patient to express their thoughts and feelings freely. Esteem building was enhanced through praise, reassurance, normalizing/challenging, and encouragement as appropriate. Coping skills were enhanced to build distress tolerance skills and emotional regulation. Allowed patient to freely discuss issues without interruption or judgement with unconditional positive regard, active listening skills, and empathy. Provided a safe, confidential environment to facilitate the development of a positive therapeutic relationship and encourage open, honest communication. Assisted patient in processing session content; acknowledged and  normalized/addressed, as appropriate, patient’s thoughts, feelings, and concerns by utilizing a person-centered approach in efforts to build appropriate rapport and a positive therapeutic relationship with open and honest communication.   - Diagnoses: see assessment and plan below  - Symptoms: see subjective above  - Goals   - patient: mitigate anxiety, improve mood sustainably, adjust to new circumstances with zach   - provider: challenge patterns of living conducive to pathology, strengthen defenses, promote problems solving, restore adaptive functioning and provide symptom relief.  - Treatment plan: continue supportive psychotherapy in subsequent appointments to provide symptom relief; see assessment and plan below for additional details:   - iteration: 1   - progress: partial   - (X)illumination, (X)contextualization, (working)detection, (working)development, (-)elaboration, (-)refinement  - functional status: fair  - mental status exam: as below  - prognosis: good    Psychiatric History:  - Pertinent interval changes: N/A  - Psychotropic medication trials: paroxetine, bupropion, escitalopram, duloxetine, others; no issues with SE in the past, but instead diminished effectiveness  - Key synopsis: no hx SIB or SA    Substance Abuse History:   - Pertinent interval changes: N/A  - Key synopsis: substances in high school as below, not since; stopped after discovering she was pregnant    Social History:  - Pertinent interval changes: as above  - Key synopsis: lives in an apartment with her daughter (17 YO) - daughter just recently moved in and there is conflict; [place] moved from [place] 09[place]05[place]20[place] due to work transfer; divorce was three years ago; manager at [employer]; teacher in the past; AD in early childhood education; Gnosticism by ethnicity, doesn't like organized Confucianism; has enjoyed sitting in [place] in the past; walks 7 miles on an average shift; often eats junk foods; 3-4 cups of coffee daily    Social  History     Socioeconomic History    Marital status:    Tobacco Use    Smoking status: Every Day     Packs/day: 1.00     Years: 26.00     Additional pack years: 0.00     Total pack years: 26.00     Types: Cigarettes    Smokeless tobacco: Never   Vaping Use    Vaping Use: Never used   Substance and Sexual Activity    Alcohol use: Never    Drug use: Not Currently     Types: Marijuana, Cocaine(coke), LSD, MDMA (ecstacy), Opium, Psilocybin     Comment: Experimented in high school    Sexual activity: Yes     Partners: Male     Birth control/protection: None     Tobacco use counseling/intervention: patient has been counseled with regard to risks of tobacco use and encouraged to consider quitting, with or without the use of adjunctive medication, by first setting a prospective quit date. Currently precontemplative by transtheoretical model.     PHQ-9 Depression Screening  PHQ-9 Total Score:      Little interest or pleasure in doing things?     Feeling down, depressed, or hopeless?     Trouble falling or staying asleep, or sleeping too much?     Feeling tired or having little energy?     Poor appetite or overeating?     Feeling bad about yourself - or that you are a failure or have let yourself or your family down?     Trouble concentrating on things, such as reading the newspaper or watching television?     Moving or speaking so slowly that other people could have noticed? Or the opposite - being so fidgety or restless that you have been moving around a lot more than usual?     Thoughts that you would be better off dead, or of hurting yourself in some way?     PHQ-9 Total Score       Change in PHQ-9 since last measure: N/A (15)    MARTA-7       Change in MARTA-7 since last measure: N/A (7)    Problem List:  Patient Active Problem List   Diagnosis    Recurrent depressive disorder, current episode mild    MARTA (generalized anxiety disorder)    Panic disorder    Benzodiazepine dependence    Nicotine dependence, cigarettes,  uncomplicated     Allergy:   No Known Allergies     Discontinued Medications:  There are no discontinued medications.    Current Medications:   Current Outpatient Medications   Medication Sig Dispense Refill    clonazePAM (KlonoPIN) 1 MG tablet TAKE 1 TABLET BY MOUTH TWICE DAILY 60 tablet 1    sertraline (ZOLOFT) 100 MG tablet TAKE 1 TABLET BY MOUTH EVERY NIGHT 90 tablet 0     No current facility-administered medications for this visit.     Past Medical History:  Past Medical History:   Diagnosis Date    Anxiety 1997    Depression 1997    Panic disorder 1997     Past Surgical History:  Past Surgical History:   Procedure Laterality Date    NO PAST SURGERIES       Mental Status Exam:   Appearance: well-groomed, sits upright, age-appropriate, above average habitus  Behavior: calm, cooperative, appropriate in demeanor, appropriate eye-contact  Mood/affect: fair / mood-congruent and appropriate in both range and amplitude  Speech: within expected variance; appropriate rate, appropriate rhythm, appropriate tone; non-pressured  Thought Process: linear, goal-directed; no FOI or JOAN; abstraction intact  Thought Content: coherent, devoid of overt delusions/perceptual disturbances  SI/HI: denies both SI and HI; exhibits future-orientation, self-advocates appropriately, no regular self-harm, no appreciable intent  Memory: no overt deficits  Orientation: oriented to person/place/time/situation  Concentration: appropriate during interview  Intellectual capacity: presumptively average  Insight: fair by given history/exam  Judgment: appropriate by given history/exam  Psychomotor: no appreciable latency/retardation/agitation/tremor  Gait: deferred    Vital Signs:   There were no vitals taken for this visit.     Lab Results:   No visits with results within 6 Month(s) from this visit.   Latest known visit with results is:   No results found for any previous visit.     EKG Results:  No orders to display     Imaging Results:  No Images  in the past 120 days found.    ASSESSMENT AND PLAN:    ICD-10-CM ICD-9-CM   1. MARTA (generalized anxiety disorder)  F41.1 300.02   2. Panic disorder  F41.0 300.01   3. Recurrent major depressive disorder, in partial remission  F33.41 296.35   4. Benzodiazepine dependence  F13.20 304.10     43 y.o. female who presents today for follow-up regarding MDD, MARTA, panic, and iatrogenic BZD dependence. We have discussed the interval history and the treatment plan below, including potential R/B/SE of the recommended regimen of which the patient demonstrates understanding. Patient is agreeable to call 911 or go to the nearest ER should she become concerned for her own safety and/or the safety of those around her. There are no medication side effects or related complaints today. There are no overt indices of acute jeanna/psychosis on exam today. JOSE reviewed and as expected.    Medication regimen: continue clonazepam and sertraline; patient is advised not to misuse prescribed medications or to use them with any exogenous substances that aren't disclosed to this provider as they may interact with the regimen to the patient's detriment.   Risk Assessment: protracted risk is low, imminent risk is low - no interval change. Do note that this is subject to change with the [employer]ist of new stressors, treatment non-adherence, use of substances, and/or new medical ails.   Monitoring: PHQ-9 and MARTA-7 a bit worse today  Therapy: defer  Follow-up: 3 months  Communications: N/A    TREATMENT PLAN/GOALS: challenge patterns of living conducive to symptom burden, continue recommended regimen as above with augmentative, intermittent supportive psychotherapy to reduce symptom burden. Patient acknowledged and verbally consented to continue treatment. The importance of adherence to the recommended treatment and interval follow-up appointments was again emphasized today: patient has good treatment adherence per given history. Patient was today  reminded to limit daily caffeine intake, hydrate appropriately, eat healthy and nutritious foods, engage sleep hygiene measures, engage appropriate exposure to sunlight, engage with hobbies in balance with life necessities, and exercise appropriate to their capacity to do so.     Billing: This encounter is of moderate complexity based on number/complexity of problems addressed today and risk of complications/morbidity: 2+ stable chronic illnesses and prescription management. Additionally, I provided 25 minutes of dedicated psychotherapy to the patient as documented above.    Parts of this note are electronic transcriptions/translations of spoken language to printed text using the Dragon Dictation system.    Electronically signed by Wang Osman MD, 01/05/24, [id number]

## 2024-01-09 ENCOUNTER — TELEPHONE (OUTPATIENT)
Dept: PSYCHIATRY | Facility: CLINIC | Age: 44
End: 2024-01-09
Payer: COMMERCIAL

## 2024-02-07 NOTE — TELEPHONE ENCOUNTER
CLINICAL VOICEMAIL     clonazePAM (KlonoPIN) 1 MG tablet (01/17/2023)    PT(PATIENT) REQUESTED A MED REFILL    PT(PATIENT) STATES SHE TRIED TO CONTACT HER PHARMACY AND WAS ADVISED SHE DID NOT HAVE ANY REFILLS     PT(PATIENT) STATES SHE IS HAVING A REFILL ISSUE WITH HER PHARMACY, AND IT HAS HAPPENED PREVIOUSLY WITH A SCRIPT FROM SAME PROVIDER   
NEXT APPT WITH PROVIDER  Appointment with Wang Osman MD (03/13/2023)    PROVIDER PLEASE ADVISE   
done

## 2024-04-26 DIAGNOSIS — F41.0 PANIC DISORDER: ICD-10-CM

## 2024-04-26 DIAGNOSIS — F41.1 GAD (GENERALIZED ANXIETY DISORDER): ICD-10-CM

## 2024-04-26 DIAGNOSIS — F33.41 RECURRENT MAJOR DEPRESSIVE DISORDER, IN PARTIAL REMISSION: ICD-10-CM

## 2024-04-26 RX ORDER — SERTRALINE HYDROCHLORIDE 100 MG/1
100 TABLET, FILM COATED ORAL NIGHTLY
Qty: 90 TABLET | Refills: 0 | Status: SHIPPED | OUTPATIENT
Start: 2024-04-26

## 2024-04-26 NOTE — TELEPHONE ENCOUNTER
Requested Renewals     Name from pharmacy: SERTRALINE 100MG TABLETS         Will file in chart as: sertraline (ZOLOFT) 100 MG tablet    Sig: Take 1 tablet by mouth Every Night.    Original sig: TAKE 1 TABLET BY MOUTH EVERY NIGHT    Disp: 90 tablet    Refills: 0 (Pharmacy requested: Not specified)    Start: 4/26/2024    Class: Normal    Non-formulary For: Panic disorder; MARTA (generalized anxiety disorder); Recurrent major depressive disorder, in partial remission    Last ordered: 3 months ago (1/5/2024) by Wang Osman MD    Last refill: 1/30/2024    Rx #: [id number]    SSRI Protocol [id number]/26/2024 11:32 AM   Protocol Details No active pregnancy on record    No positive pregnancy test in past 12 months    Blood pressure on record in past 15 months    PHQ2 or PHQ9 on record in past 12 months    Recent or future visit with authorizing provider      To be filled at: [facility] #[id number] - [place] - [address] AT [facility] [address] - [phone] [facility] [phone] FX

## 2024-04-30 ENCOUNTER — OFFICE VISIT (OUTPATIENT)
Dept: PSYCHIATRY | Facility: CLINIC | Age: 44
End: 2024-04-30
Payer: COMMERCIAL

## 2024-04-30 VITALS
HEIGHT: 66 IN | SYSTOLIC BLOOD PRESSURE: 129 MMHG | BODY MASS INDEX: 42.11 KG/M2 | WEIGHT: 262 LBS | DIASTOLIC BLOOD PRESSURE: 74 MMHG | HEART RATE: 77 BPM

## 2024-04-30 DIAGNOSIS — F33.0 RECURRENT DEPRESSIVE DISORDER, CURRENT EPISODE MILD: ICD-10-CM

## 2024-04-30 DIAGNOSIS — F13.20 BENZODIAZEPINE DEPENDENCE: ICD-10-CM

## 2024-04-30 DIAGNOSIS — F41.0 PANIC DISORDER: ICD-10-CM

## 2024-04-30 DIAGNOSIS — F41.1 GAD (GENERALIZED ANXIETY DISORDER): Primary | ICD-10-CM

## 2024-04-30 PROCEDURE — 99214 OFFICE O/P EST MOD 30 MIN: CPT | Performed by: PSYCHIATRY & NEUROLOGY

## 2024-04-30 PROCEDURE — 90836 PSYTX W PT W E/M 45 MIN: CPT | Performed by: PSYCHIATRY & NEUROLOGY

## 2024-04-30 RX ORDER — CLONAZEPAM 1 MG/1
1 TABLET ORAL 2 TIMES DAILY
Qty: 60 TABLET | Refills: 2 | Status: SHIPPED | OUTPATIENT
Start: 2024-05-07

## 2024-04-30 NOTE — PROGRESS NOTES
"[facility]  Follow-up Visit    Chief Complaint: \"I just moved here and I was under the care of someone else and they stopped practicing in the three months that I lived here.\"    History of Present Illness: Cherise Roque is a 43 y.o. female who presents today for follow-up regarding MDD, MARTA, panic, and iatrogenic BZD dependence. Last seen: 01/05 at which time no changes were made to her regimen. She presents unaccompanied in no acute distress and engages with me appropriately. Psychotropic regimen perceived to be partially effective. Side-effects per given history: denies.    Current treatment regimen includes:   - sertraline 100 mg HS  - clonazepam 1 mg BID    Today she reports she's doing okay. Depression symptoms are adequately managed with current interventions. Anxiety symptoms are adequately managed with current interventions. Panic symptoms are fairly managed with current interventions. She has continued to consider moving back to [place], has had promotions offered to her in other locations and isn't yet sure she'd like to do this. She feels she'd still like to look to relocate, but wants to wait for a better match to her values/interests. She blames herself for being unhappy in this new setting because she tends to avoid engagement and to isolate at home. She hasn't been getting out for stimulation or engagement. Discussed this pattern as it has manifested in her living and ways to challenge it. Thought process and content are devoid of overt aberration suggestive of acute jeanna/psychosis. The patient denies SI/HI/AVH. There are no overt changes on exam today compared to most recent evaluation.  - contextual changes: mother had a double mastectomy and removal of her \"female organs\", daughter is doing some better with regard to mental health, but is suffering with costochondritis (her work is giving her grief about time off; she's planning to move back to [place], which the " patient understands, but has concern about her safety moving back to the city due to history of substance use issues - people, places, and things)  - sleep: variable, sometimes feels she's napping on and off throughout the night  - appetite: changing her diet to feel better and healthier    I have counseled the patient with regard to diagnoses and the recommended treatment regimen as documented below. Patient acknowledges the diagnoses per my rendered interpretation. Patient demonstrates understanding of potential risks/benefits/side effects associated with this regimen and is amenable to proceed in this fashion.     Psychotherapy  - Time: 38 minutes  - interventions employed: the therapeutic alliance was strengthened to encourage the patient to express their thoughts and feelings freely. Esteem building was enhanced through praise, reassurance, normalizing/challenging, and encouragement as appropriate. Coping skills were enhanced to build distress tolerance skills and emotional regulation. Allowed patient to freely discuss issues without interruption or judgement with unconditional positive regard, active listening skills, and empathy. Provided a safe, confidential environment to facilitate the development of a positive therapeutic relationship and encourage open, honest communication. Assisted patient in processing session content; acknowledged and normalized/addressed, as appropriate, patient’s thoughts, feelings, and concerns by utilizing a person-centered approach in efforts to build appropriate rapport and a positive therapeutic relationship with open and honest communication.   - Diagnoses: see assessment and plan below  - Symptoms: see subjective above  - Goals   - patient: mitigate anxiety, improve mood sustainably, adjust to new circumstances with zach   - provider: challenge patterns of living conducive to pathology, strengthen defenses, promote problems solving, restore adaptive functioning and provide  symptom relief.  - Treatment plan: continue supportive psychotherapy in subsequent appointments to provide symptom relief; see assessment and plan below for additional details:   - iteration: 1   - progress: partial   - (X)illumination, (X)contextualization, (working)detection, (working)development, (-)elaboration, (-)refinement  - functional status: fair  - mental status exam: as below  - prognosis: good    Psychiatric History:  - Pertinent interval changes: N/A  - Psychotropic medication trials: paroxetine, bupropion, escitalopram, duloxetine, others; no issues with SE in the past, but instead diminished effectiveness  - Key synopsis: no hx SIB or SA    Substance Abuse History:   - Pertinent interval changes: N/A  - Key synopsis: substances in high school as below, not since; stopped after discovering she was pregnant    Social History:  - Pertinent interval changes: as above  - Key synopsis: lives in an apartment with her daughter (17 YO) - daughter just recently moved in and there is conflict; patient moved from [place] 09/05/20[place] due to work transfer; divorce was three years ago; manager at [employer]; teacher in the past; AD in early childhood education; Orthodox by ethnicity, doesn't like organized Quaker; has enjoyed sitting in [employer] in the past; walks 7 miles on an average shift; often eats junk foods; 3-4 cups of coffee daily    Social History     Socioeconomic History    Marital status:    Tobacco Use    Smoking status: Every Day     Current packs/day: 1.00     Average packs/day: 1 pack/day for 26.0 years (26.0 ttl pk-yrs)     Types: Cigarettes    Smokeless tobacco: Never   Vaping Use    Vaping status: Never Used   Substance and Sexual Activity    Alcohol use: Never    Drug use: Not Currently     Types: Marijuana, Cocaine(coke), LSD, MDMA (ecstacy), Opium, Psilocybin     Comment: Experimented in high school    Sexual activity: Yes     Partners: Male     Birth control/protection: None      Tobacco use counseling/intervention: patient has been counseled with regard to risks of tobacco use and encouraged to consider quitting, with or without the use of adjunctive medication, by first setting a prospective quit date. Currently precontemplative by transtheoretical model.     PHQ-9 Depression Screening  PHQ-9 Total Score: 14    Little interest or pleasure in doing things? 2-->more than half the days   Feeling down, depressed, or hopeless? 1-->several days   Trouble falling or staying asleep, or sleeping too much? 2-->more than half the days   Feeling tired or having little energy? 3-->nearly every day   Poor appetite or overeating? 3-->nearly every day   Feeling bad about yourself - or that you are a failure or have let yourself or your family down? 3-->nearly every day   Trouble concentrating on things, such as reading the newspaper or watching television? 0-->not at all   Moving or speaking so slowly that other people could have noticed? Or the opposite - being so fidgety or restless that you have been moving around a lot more than usual? 0-->not at all   Thoughts that you would be better off dead, or of hurting yourself in some way? 0-->not at all   PHQ-9 Total Score 14     Change in PHQ-9 since last measure: -1 (15)    MARTA-7  Feeling nervous, anxious or on edge: Several days  Not being able to stop or control worrying: Several days  Worrying too much about different things: Several days  Trouble Relaxing: Not at all  Being so restless that it is hard to sit still: Not at all  Feeling afraid as if something awful might happen: Not at all  Becoming easily annoyed or irritable: Several days  MARTA 7 Total Score: 4  If you checked any problems, how difficult have these problems made it for you to do your work, take care of things at home, or get along with other people: Somewhat difficult    Change in MARTA-7 since last measure: -3 (7)    Problem List:  Patient Active Problem List   Diagnosis    Recurrent  depressive disorder, current episode mild    MARTA (generalized anxiety disorder)    Panic disorder    Benzodiazepine dependence    Nicotine dependence, cigarettes, uncomplicated     Allergy:   No Known Allergies     Discontinued Medications:  There are no discontinued medications.    Current Medications:   Current Outpatient Medications   Medication Sig Dispense Refill    clonazePAM (KlonoPIN) 1 MG tablet Take 1 tablet by mouth 2 (Two) Times a Day. 60 tablet 2    sertraline (ZOLOFT) 100 MG tablet TAKE 1 TABLET BY MOUTH EVERY NIGHT 90 tablet 0     No current facility-administered medications for this visit.     Past Medical History:  Past Medical History:   Diagnosis Date    Anxiety 1997    Depression 1997    Panic disorder 1997     Past Surgical History:  Past Surgical History:   Procedure Laterality Date    NO PAST SURGERIES       Mental Status Exam:   Appearance: well-groomed, sits upright, age-appropriate, above average habitus  Behavior: calm, cooperative, appropriate in demeanor, appropriate eye-contact  Mood/affect: fair / mood-congruent and appropriate in both range and amplitude  Speech: within expected variance; appropriate rate, appropriate rhythm, appropriate tone; non-pressured  Thought Process: linear, goal-directed; no FOI or JOAN; abstraction intact  Thought Content: coherent, devoid of overt delusions/perceptual disturbances  SI/HI: denies both SI and HI; exhibits future-orientation, self-advocates appropriately, no regular self-harm, no appreciable intent  Memory: no overt deficits  Orientation: oriented to person/place/time/situation  Concentration: appropriate during interview  Intellectual capacity: presumptively average  Insight: fair by given history/exam  Judgment: appropriate by given history/exam  Psychomotor: no appreciable latency/retardation/agitation/tremor  Gait: deferred    Review of Systems   Constitutional:  Positive for unexpected weight change. Negative for activity change, appetite  "change and fatigue.   Gastrointestinal:  Negative for abdominal pain, diarrhea, nausea and vomiting.   Psychiatric/Behavioral:  Negative for agitation and sleep disturbance.      Vital Signs:   /74   Pulse 77   Ht 167.6 cm (66\")   Wt 119 kg (262 lb)   BMI 42.29 kg/m²      Lab Results:   No visits with results within 6 Month(s) from this visit.   Latest known visit with results is:   No results found for any previous visit.     EKG Results:  No orders to display     Imaging Results:  No Images in the past 120 days found.    ASSESSMENT AND PLAN:    ICD-10-CM ICD-9-CM   1. MARTA (generalized anxiety disorder)  F41.1 300.02   2. Panic disorder  F41.0 300.01   3. Recurrent depressive disorder, current episode mild  F33.0 296.31     43 y.o. female who presents today for follow-up regarding MDD, MARTA, panic, and iatrogenic BZD dependence. We have discussed the interval history and the treatment plan below, including potential R/B/SE of the recommended regimen of which the patient demonstrates understanding. Patient is agreeable to call 911 or go to the nearest ER should she become concerned for her own safety and/or the safety of those around her. There are no medication side effects or related complaints today. There are no overt indices of acute jeanna/psychosis on exam today. JOSE reviewed and as expected.    Medication regimen: no change - continue clonazepam and sertraline; patient is advised not to misuse prescribed medications or to use them with any exogenous substances that aren't disclosed to this provider as they may interact with the regimen to the patient's detriment.   Risk Assessment: protracted risk is low, imminent risk is low - no interval change. Do note that this is subject to change with the [employer] of new stressors, treatment non-adherence, use of substances, and/or new medical ails.   Monitoring: PHQ-9 and MARTA-7 a bit worse today  Therapy: defer  Follow-up: 3 months  Communications: " N/A    TREATMENT PLAN/GOALS: challenge patterns of living conducive to symptom burden, continue recommended regimen as above with augmentative, intermittent supportive psychotherapy to reduce symptom burden. Patient acknowledged and verbally consented to continue treatment. The importance of adherence to the recommended treatment and interval follow-up appointments was again emphasized today: patient has good treatment adherence per given history. Patient was today reminded to limit daily caffeine intake, hydrate appropriately, eat healthy and nutritious foods, engage sleep hygiene measures, engage appropriate exposure to sunlight, engage with hobbies in balance with life necessities, and exercise appropriate to their capacity to do so.     Billing: This encounter is of moderate complexity based on number/complexity of problems addressed today and risk of complications/morbidity: 2+ stable chronic illnesses and prescription management. Additionally, I provided 38 minutes of dedicated psychotherapy to the patient, distinct from E/M services, as documented above. Start time: 0954. Stop time: 1032.     Parts of this note are electronic transcriptions/translations of spoken language to printed text using the Dragon Dictation system.    Electronically signed by Wang Osman MD, 04/30/24, 1037   Acitretin Pregnancy And Lactation Text: This medication is Pregnancy Category X and should not be given to women who are pregnant or may become pregnant in the future. This medication is excreted in breast milk.

## 2024-08-13 ENCOUNTER — OFFICE VISIT (OUTPATIENT)
Dept: PSYCHIATRY | Facility: CLINIC | Age: 44
End: 2024-08-13
Payer: COMMERCIAL

## 2024-08-13 VITALS
BODY MASS INDEX: 40.02 KG/M2 | HEART RATE: 87 BPM | SYSTOLIC BLOOD PRESSURE: 132 MMHG | DIASTOLIC BLOOD PRESSURE: 78 MMHG | HEIGHT: 66 IN | WEIGHT: 249 LBS

## 2024-08-13 DIAGNOSIS — F13.20 BENZODIAZEPINE DEPENDENCE: ICD-10-CM

## 2024-08-13 DIAGNOSIS — F33.0 RECURRENT DEPRESSIVE DISORDER, CURRENT EPISODE MILD: ICD-10-CM

## 2024-08-13 DIAGNOSIS — F41.1 GAD (GENERALIZED ANXIETY DISORDER): Primary | ICD-10-CM

## 2024-08-13 DIAGNOSIS — F41.0 PANIC DISORDER: ICD-10-CM

## 2024-08-13 PROBLEM — M47.812 CERVICAL SPONDYLOSIS WITHOUT MYELOPATHY: Status: ACTIVE | Noted: 2017-11-06

## 2024-08-13 PROCEDURE — 99214 OFFICE O/P EST MOD 30 MIN: CPT | Performed by: PSYCHIATRY & NEUROLOGY

## 2024-08-13 PROCEDURE — 90833 PSYTX W PT W E/M 30 MIN: CPT | Performed by: PSYCHIATRY & NEUROLOGY

## 2024-08-13 RX ORDER — CLONAZEPAM 1 MG/1
1 TABLET ORAL 2 TIMES DAILY
Qty: 60 TABLET | Refills: 2 | Status: SHIPPED | OUTPATIENT
Start: 2024-08-16

## 2024-08-13 RX ORDER — SERTRALINE HYDROCHLORIDE 100 MG/1
100 TABLET, FILM COATED ORAL NIGHTLY
Qty: 90 TABLET | Refills: 0 | Status: SHIPPED | OUTPATIENT
Start: 2024-08-13

## 2024-08-13 NOTE — TREATMENT PLAN
Multi-Disciplinary Problems (from Behavioral Health Treatment Plan)      Active Problems       Problem:  Patient Care Overview (Adult)  Start Date: 08/13/24      Goal Priority Start Date Expected End Date End Date    Plan of Care Review -- 08/13/24 02/11/25 --    Goal Details: Treatment Planning for Cherise Adam diagnoses/problems:    - Anxiety/panic: enhance engagement with regard to ego-syntonic items of living via routine building and disruption of inhibitory executive cognitive circuits; challenge avoidance of ego-syntonic items of living via disruption to perceived barriers; reduce salience of fears and overwhelm with regard to their effects on thinking and behavior; establish and maintain barriers with family in order to mitigate associated dysfunction and detriments to her own wellbeing  - progress: partial, plateau  - frequency of intervention: as needed  - duration of treatment: until optimized functional status is met    - Depression: enhance motivation and interest with regard to ego-syntonic items of living via routine building and disruption of inhibitory executive cognitive circuits; challenge withdrawal from ego-syntonic items of living; cultivate sydney and satisfaction via a consistent practice of appreciation; consistently practice redirection from intrusive ego-dystonic thoughts towards actionable items to reduce influence these thoughts have in emotions and behavior.  - progress: good, nearing goal  - frequency of intervention: as needed  - duration of treatment: until optimized functional status is met
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
## 2024-08-13 NOTE — PLAN OF CARE
Please see individual notes associated with unique patient encounters for more specific information with regard to the patient's status and progress towards treatment goals.

## 2024-10-04 ENCOUNTER — OFFICE VISIT (OUTPATIENT)
Dept: FAMILY MEDICINE CLINIC | Facility: CLINIC | Age: 44
End: 2024-10-04
Payer: COMMERCIAL

## 2024-10-04 VITALS
DIASTOLIC BLOOD PRESSURE: 82 MMHG | HEIGHT: 67 IN | HEART RATE: 94 BPM | OXYGEN SATURATION: 96 % | TEMPERATURE: 98.2 F | SYSTOLIC BLOOD PRESSURE: 130 MMHG | WEIGHT: 248 LBS | BODY MASS INDEX: 38.92 KG/M2

## 2024-10-04 DIAGNOSIS — S32.2XXA CLOSED FRACTURE OF COCCYX, INITIAL ENCOUNTER: Primary | ICD-10-CM

## 2024-10-04 NOTE — LETTER
October 4, 2024     Patient: Cherise Roque   YOB: 1980   Date of Visit: 10/4/2024       To Whom It May Concern:    It is my medical opinion that Cherise Roque may return to work in two days with restrictions on lifting weights more than 10 pounds, prolonged sitting or prolonged walking.           Sincerely,        Verna Zuniga MD    CC: No Recipients

## 2024-10-04 NOTE — PROGRESS NOTES
"Chief Complaint  Establish Care (Pt would like referral for broken tail bone)    Subjective      Cherise Roque is a 44 y.o. female who presents to [facility]     Patient Care Team:  Verna Zuniga MD as PCP - General (Family Medicine)    Patient's current complaints are lower back pain.  Patient had a fall and landed on the steps on 9/28/2024.  She hit her tailbone, shoulder and left wrist.  She went to urgent care on 9/29, got x-rays of left wrist, thoracic, cervical spine, sacrum and coccyx.  She was found to have a cortical irregularity at the sacrococcygeal junction suspicious for fracture.  Patient here today with persistent pain of her coccyx, has been taking ibuprofen, applying ice or heat which is partially helpful.  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  Denies urinary or fecal incontinence, wearing a supporting brace for left wrist, denies any tingling or numbness of fingers.      Review of Systems   Musculoskeletal:  Positive for arthralgias and back pain.         Objective   Vital Signs:   Vitals:    10/04/24 1156   BP: 130/82   Pulse: 94   Temp: 98.2 °F (36.8 °C)   TempSrc: Temporal   SpO2: 96%   Weight: 112 kg (248 lb)   Height: 170.2 cm (67.01\")     Body mass index is 38.83 kg/m².    Wt Readings from Last 3 Encounters:   10/04/24 112 kg (248 lb)   09/29/24 112 kg (247 lb)   08/13/24 113 kg (249 lb)     BP Readings from Last 3 Encounters:   10/04/24 130/82   09/29/24 119/67   08/13/24 132/78       Health Maintenance   Topic Date Due    BMI FOLLOWUP  Never done    Pneumococcal Vaccine 0-64 (1 of 2 - PCV) Never done    TDAP/TD VACCINES (1 - Tdap) Never done    HEPATITIS C SCREENING  Never done    ANNUAL PHYSICAL  Never done    MAMMOGRAM  11/01/2024 (Originally 1980)    PAP SMEAR  11/26/2024 (Originally 12/12/2022)    COLORECTAL CANCER SCREENING  11/28/2024 " (Originally 1980)    COVID-19 Vaccine (1 - 2023-24 season) 12/24/2024 (Originally 9/1/2024)    INFLUENZA VACCINE  03/31/2025 (Originally 8/1/2024)       Physical Exam  Cardiovascular:      Rate and Rhythm: Normal rate and regular rhythm.      Pulses: Normal pulses.      Heart sounds: Normal heart sounds.   Pulmonary:      Effort: Pulmonary effort is normal.      Breath sounds: Normal breath sounds.   Musculoskeletal:         General: Tenderness present. No swelling.      Comments: Tenderness over the coccygeal area   Skin:     General: Skin is warm.      Findings: Bruising present.      Comments: Mild bruising over the left wrist medially   Psychiatric:         Mood and Affect: Mood normal.         Result Review   The following data was reviewed by: Verna Zuniga MD on 10/04/2024:  [x]  Tests & Results  []  Hospitalization/Emergency Department/Urgent Care  []  Internal/External Consultant Notes      ASSESSMENT/PLAN  Diagnoses and all orders for this visit:    1. Closed fracture of coccyx, initial encounter (Primary)  -     Ambulatory Referral to Physical Therapy for Evaluation & Treatment  -     Ambulatory Referral to Orthopedic Surgery    - Recommended rest, application of ice or heat, advised to use a donut pillow or rubber cushion pillow for sitting  -Continue taking NSAIDs for pain, referred her to physical therapy and Ortho.      FOLLOW UP  Return in about 6 weeks (around 11/15/2024).  Patient was given instructions and counseling regarding her condition or for health maintenance advice. Please see specific information pulled into the AVS if appropriate.       Verna Zuniga MD  10/04/24  12:41 EDT

## 2024-10-07 ENCOUNTER — PATIENT ROUNDING (BHMG ONLY) (OUTPATIENT)
Dept: FAMILY MEDICINE CLINIC | Facility: CLINIC | Age: 44
End: 2024-10-07
Payer: COMMERCIAL

## 2024-10-07 NOTE — PROGRESS NOTES
A [employer] MESSAGE HAS BEEN SENT TO THE PATIENT FOR PATIENT ROUNDING WITH [facility]

## 2024-11-12 ENCOUNTER — OFFICE VISIT (OUTPATIENT)
Dept: PSYCHIATRY | Facility: CLINIC | Age: 44
End: 2024-11-12
Payer: COMMERCIAL

## 2024-11-12 VITALS
OXYGEN SATURATION: 96 % | HEART RATE: 79 BPM | BODY MASS INDEX: 40.65 KG/M2 | HEIGHT: 67 IN | WEIGHT: 259 LBS | DIASTOLIC BLOOD PRESSURE: 79 MMHG | SYSTOLIC BLOOD PRESSURE: 113 MMHG

## 2024-11-12 DIAGNOSIS — F13.20 BENZODIAZEPINE DEPENDENCE: ICD-10-CM

## 2024-11-12 DIAGNOSIS — F41.0 PANIC DISORDER: ICD-10-CM

## 2024-11-12 DIAGNOSIS — F41.1 GAD (GENERALIZED ANXIETY DISORDER): Primary | ICD-10-CM

## 2024-11-12 RX ORDER — CLONAZEPAM 1 MG/1
1 TABLET ORAL 2 TIMES DAILY
Qty: 60 TABLET | Refills: 2 | Status: SHIPPED | OUTPATIENT
Start: 2024-12-03

## 2024-11-12 RX ORDER — SERTRALINE HYDROCHLORIDE 100 MG/1
100 TABLET, FILM COATED ORAL NIGHTLY
Qty: 90 TABLET | Refills: 0 | Status: SHIPPED | OUTPATIENT
Start: 2024-11-12

## 2024-11-12 NOTE — PROGRESS NOTES
"[facility]  Follow-up Visit    Chief Complaint: \"I just moved here and I was under the care of someone else and they stopped practicing in the three months that I lived here.\"    History of Present Illness: Cherise Roque is a 44 y.o. female who presents today for follow-up regarding MDD, MARTA, panic, and iatrogenic BZD dependence. Last seen: 08/13 at which time no changes were made to her regimen. She presents unaccompanied in no acute distress and engages with me appropriately. Psychotropic regimen perceived to be partially effective. Side-effects per given history: denies.    Current treatment regimen includes:   - sertraline 100 mg HS  - clonazepam 1 mg BID    Today Cherise reports she's been unable to work after falling down the stairs - and sustaining fractures to her wrist and coccyx as well as lacerating her fingers - and is getting only $50 weekly from her employer. She's been having nightmares about this fall. She had her wrist reset manually without analgesics (drove herself to the hospital). She has had some support from family during this period that has been a good deal of help. Medical care is reported to have been good related to these issues; it was incidentally found that cervical stenosis had progressed since she was last aware of it - symptoms had remitted for some time since age 32 YO. Financial stressors are contributory to symptom burden and general dysfunction. Depression symptoms are fairly managed with current interventions. Anxiety symptoms are fairly managed with current interventions; she has some interest in tapering clonazepam, maybe eventually discontinuing. I've advised this should be a slow process - she's amenable to trial a half-dose in the evening to assess for tolerance. I've advised we can tailor the taper plan to her needs and tolerance as we go. Panic symptoms are fairly managed with current interventions. Thought process and content " are devoid of overt aberration suggestive of acute jeanna/psychosis. The patient denies SI/HI/AVH. There are no overt changes on exam today compared to most recent evaluation.  - contextual changes: as above; stayed with family in [place] during [place] for a month - was advised she recovered remarkably well; will return to work today (will be working PT for around a month); bills/financial responsibility are significantly contributory to stress  - sleep: remains variable   - appetite: generally adequate; +10 lb since last visit    I have counseled the patient with regard to diagnoses and the recommended treatment regimen as documented below. Patient acknowledges the diagnoses per my rendered interpretation. Patient demonstrates understanding of potential risks/benefits/side effects associated with this regimen and is amenable to proceed in this fashion.     Psychotherapy  - Time: 20 minutes  - interventions employed: the therapeutic alliance was strengthened to encourage the patient to express their thoughts and feelings freely. Esteem building was enhanced through praise, reassurance, normalizing/challenging, and encouragement as appropriate. Coping skills were enhanced to build distress tolerance skills and emotional regulation. Allowed patient to freely discuss issues without interruption or judgement with unconditional positive regard, active listening skills, and empathy. Provided a safe, confidential environment to facilitate the development of a positive therapeutic relationship and encourage open, honest communication. Assisted patient in processing session content; acknowledged and normalized/addressed, as appropriate, patient’s thoughts, feelings, and concerns by utilizing a person-centered approach in efforts to build appropriate rapport and a positive therapeutic relationship with open and honest communication.   - Diagnoses: see assessment and plan below  - Symptoms: see subjective above  - Goals   -  patient: mitigate anxiety, improve mood sustainably, adjust to new circumstances with zach   - provider: challenge patterns of living conducive to pathology, strengthen defenses, promote problems solving, restore adaptive functioning and provide symptom relief.  - Treatment plan: continue supportive psychotherapy in subsequent appointments to provide symptom relief; see assessment and plan below for additional details:   - iteration: 1   - progress: partial   - (X)illumination, (X)contextualization, (X)detection, (working)development, (working)elaboration, (-)refinement  - functional status: fair  - mental status exam: as below  - prognosis: good    Psychiatric History:  - Psychotropic medication trials: paroxetine, bupropion, escitalopram, duloxetine, others; no issues with SE in the past, but instead diminished effectiveness  - Key synopsis: no hx SIB or SA    Substance Abuse History:   - Key synopsis: substances in high school as below, not since; stopped after discovering she was pregnant    Social History:  - Key synopsis: lives in an apartment with her daughter (17 YO) - daughter just recently moved in and there is conflict; patient moved from [place] 09/05/2022 due to work transfer; divorce was three years ago; manager at [employer]; teacher in the past; AD in early childhood education; Buddhist by ethnicity, doesn't like organized Mosque; has enjoyed sitting in [employer] in the past; walks 7 miles on an average shift; often eats junk foods; 3-4 cups of coffee daily    Social History     Socioeconomic History    Marital status:    Tobacco Use    Smoking status: Every Day     Current packs/day: 1.00     Average packs/day: 1 pack/day for 26.0 years (26.0 ttl pk-yrs)     Types: Cigarettes    Smokeless tobacco: Never   Vaping Use    Vaping status: Never Used   Substance and Sexual Activity    Alcohol use: Never    Drug use: Not Currently     Types: Marijuana, Cocaine(coke), LSD, MDMA (ecstacy), Opium,  Psilocybin     Comment: Experimented in high school    Sexual activity: Yes     Partners: Male     Birth control/protection: None     Tobacco use counseling/intervention: patient has been counseled with regard to risks of tobacco use and encouraged to consider quitting, with or without the use of adjunctive medication, by first setting a prospective quit date. Currently precontemplative by transtheoretical model.     PHQ-9 Depression Screening  PHQ-9 Total Score:      Little interest or pleasure in doing things?     Feeling down, depressed, or hopeless?     Trouble falling or staying asleep, or sleeping too much?     Feeling tired or having little energy?     Poor appetite or overeating?     Feeling bad about yourself - or that you are a failure or have let yourself or your family down?     Trouble concentrating on things, such as reading the newspaper or watching television?     Moving or speaking so slowly that other people could have noticed? Or the opposite - being so fidgety or restless that you have been moving around a lot more than usual?     Thoughts that you would be better off dead, or of hurting yourself in some way?     PHQ-9 Total Score       Change in PHQ-9 since last measure: N/A (14)    MARTA-7       Change in MARTA-7 since last measure: N/A (4)    Problem List:  Patient Active Problem List   Diagnosis    Recurrent depressive disorder, current episode mild    MARTA (generalized anxiety disorder)    Panic disorder    Benzodiazepine dependence    Nicotine dependence, cigarettes, uncomplicated    Cervical spondylosis without myelopathy     Allergy:   Allergies   Allergen Reactions    Amoxicillin-Pot Clavulanate Diarrhea      Discontinued Medications:  There are no discontinued medications.    Current Medications:   Current Outpatient Medications   Medication Sig Dispense Refill    clonazePAM (KlonoPIN) 1 MG tablet Take 1 tablet by mouth 2 (Two) Times a Day. 60 tablet 2    ibuprofen (ADVIL,MOTRIN) 800 MG  "tablet Take 1 tablet by mouth Every 8 (Eight) Hours As Needed for Mild Pain. 30 tablet 0    sertraline (ZOLOFT) 100 MG tablet Take 1 tablet by mouth Every Night. 90 tablet 0     No current facility-administered medications for this visit.     Past Medical History:  Past Medical History:   Diagnosis Date    Anxiety 1997    Depression 1997    Panic disorder 1997     Past Surgical History:  Past Surgical History:   Procedure Laterality Date    NO PAST SURGERIES       Mental Status Exam:   Appearance: well-groomed, sits upright, age-appropriate, above average habitus  Behavior: calm, cooperative, appropriate in demeanor, appropriate eye-contact  Mood/affect: stressed / mood-congruent and appropriate in both range and amplitude  Speech: within expected variance; appropriate rate, appropriate rhythm, appropriate tone; non-pressured  Thought Process: linear, goal-directed; no FOI or JOAN; abstraction intact  Thought Content: coherent, devoid of overt delusions/perceptual disturbances  SI/HI: denies both SI and HI; exhibits future-orientation, self-advocates appropriately, no regular self-harm, no appreciable intent  Memory: no overt deficits  Orientation: oriented to person/place/time/situation  Concentration: appropriate during interview  Intellectual capacity: presumptively average  Insight: fair by given history/exam  Judgment: appropriate by given history/exam  Psychomotor: no appreciable latency/retardation/agitation/tremor  Gait: deferred    Review of Systems   Constitutional:  Positive for fatigue.   Psychiatric/Behavioral:  Positive for agitation and sleep disturbance.      Vital Signs:   /79   Pulse 79   Ht 170.2 cm (67.01\")   Wt 117 kg (259 lb)   SpO2 96%   BMI 40.55 kg/m²      Lab Results:   No visits with results within 6 Month(s) from this visit.   Latest known visit with results is:   No results found for any previous visit.     EKG Results:  No orders to display     Imaging Results:  XR Wrist 3+ " View Left  Result Date: 9/29/2024  Impression: No osseous abnormalities are identified in the left wrist. Electronically Signed: Ken Nowak MD    XR Sacrum & Coccyx  Result Date: 9/29/2024  Impression: Cortical irregularity at the sacrococcygeal junction suspicious for fracture. Electronically Signed: Ken Nowak MD    XR Spine Thoracic 3 View  Result Date: 9/29/2024  Impression: Minimal degenerative change. No acute osseous abnormalities are identified in the thoracic spine. Electronically Signed: Ken Nowak MD    XR Spine Cervical 3 View  Result Date: 9/29/2024  Impression: No osseous abnormalities are identified in the cervical spine. Electronically Signed: Ken Nowak MD    ASSESSMENT AND PLAN:    ICD-10-CM ICD-9-CM   1. MARTA (generalized anxiety disorder)  F41.1 300.02   2. Panic disorder  F41.0 300.01   3. Benzodiazepine dependence  F13.20 304.10     44 y.o. female who presents today for follow-up regarding MDD, MARTA, panic, and iatrogenic BZD dependence. We have discussed the interval history and the treatment plan below, including potential R/B/SE of the recommended regimen of which the patient demonstrates understanding. Patient is agreeable to call 911 or go to the nearest ER should she become concerned for her own safety and/or the safety of those around her. There are no medication side effects or related complaints today. There are no overt indices of acute jeanna/psychosis on exam today.     Medication regimen: no formal changes - continue clonazepam (will trial a taper) and sertraline; patient is advised not to misuse prescribed medications or to use them with any exogenous substances that aren't disclosed to this provider as they may interact with the regimen to the patient's detriment.   Risk assessment: protracted risk is low, imminent risk is low - no interval change. Do note that this is subject to change with the [employer] of new stressors, treatment non-adherence, use of substances, and/or  new medical ails.   Monitoring: N/A  Therapy: defer  Follow-up: 3 months  Communications: N/A  Treatment plan: 08/13/2024    TREATMENT PLAN/GOALS: challenge patterns of living conducive to symptom burden, continue recommended regimen as above with augmentative, intermittent supportive psychotherapy to reduce symptom burden. Patient acknowledged and verbally consented to continue treatment. The importance of adherence to the recommended treatment and interval follow-up appointments was again emphasized today: patient has good treatment adherence per given history. Patient was today reminded to limit daily caffeine intake, hydrate appropriately, eat healthy and nutritious foods, engage sleep hygiene measures, engage appropriate exposure to sunlight, engage with hobbies in balance with life necessities, and exercise appropriate to their capacity to do so.     Billing: This encounter is of moderate complexity based on number/complexity of problems addressed today and risk of complications/morbidity: 2+ stable chronic illnesses and prescription management. Additionally, I provided 20 minutes of dedicated psychotherapy to the patient, distinct from E/M services, as documented above. Start time: 1220. Stop time: 1240.     Parts of this note are electronic transcriptions/translations of spoken language to printed text using the Dragon Dictation system.    Electronically signed by Wang Osman MD, 11/12/24, 1247

## 2025-02-13 ENCOUNTER — OFFICE VISIT (OUTPATIENT)
Dept: PSYCHIATRY | Facility: CLINIC | Age: 45
End: 2025-02-13
Payer: COMMERCIAL

## 2025-02-13 VITALS
HEART RATE: 90 BPM | DIASTOLIC BLOOD PRESSURE: 84 MMHG | BODY MASS INDEX: 40.87 KG/M2 | HEIGHT: 67 IN | WEIGHT: 260.4 LBS | SYSTOLIC BLOOD PRESSURE: 135 MMHG

## 2025-02-13 DIAGNOSIS — F41.1 GAD (GENERALIZED ANXIETY DISORDER): Primary | ICD-10-CM

## 2025-02-13 DIAGNOSIS — F41.0 PANIC DISORDER: ICD-10-CM

## 2025-02-13 DIAGNOSIS — F33.41 MAJOR DEPRESSIVE DISORDER, RECURRENT EPISODE, IN PARTIAL REMISSION: ICD-10-CM

## 2025-02-13 RX ORDER — SERTRALINE HYDROCHLORIDE 100 MG/1
100 TABLET, FILM COATED ORAL NIGHTLY
Qty: 90 TABLET | Refills: 0 | Status: SHIPPED | OUTPATIENT
Start: 2025-02-13

## 2025-02-13 NOTE — PROGRESS NOTES
"[facility]  Follow-up Visit    Chief Complaint: \"I just moved here and I was under the care of someone else and they stopped practicing in the three months that I lived here.\"     History of Present Illness: Cherise Roque is a 44 y.o. female who presents today for follow-up. Last seen by this practice: 11/12 at which time no changes were made to her regimen.     Current treatment regimen includes:   - sertraline 100 mg HS  - clonazepam 1 mg QD (successfully tapered)    Cherise presents on time and unaccompanied in no acute distress and engages with me appropriately. Today she reports she's doing okay - she's now taking one clonazepam daily and had been managing well with this practice. Anxious, panic, and depressive symptoms are manageable with current interventions.   - sleep: no changes  - appetite: no changes; weight stable  - medication adverse effects: denies    Interval History and Clinical Commentary:   Ego-syntonic. Cherise presents in a fashion consistent with the spectrum of prior assessments with regard to MSE.    Daughter came from [place] and stayed with Cherise for around a month. During this period she had an isolated episode during which she began to feel that she was \"done\" with her job as a parent, that the world had no place for her any longer; she has since come to see this as a deviation from her typical character and perspective - it was a transient moment of overwhelm and she hasn't felt this way since; she suspects this may have had something to do with tapering her dose of clonazepam. She isn't concerned about this going on further, in fact today reports she feels optimistic and proud of herself for the successes she's had in treatment thus far. She has no plan or intent for self-harm and is no longer having passive thoughts of being better off dead.    Axis I: MARTA, panic disorder  Axis II: defer  Axis III: defer  Axis IV: vocational " stress, familial stress  Axis V: 70    Differential considerations: N/A    Adherence:  Treatment adherence is fair; issues in this regard have included: missed appointments. Patient is advised not to misuse prescribed medications or to use them with any exogenous substances that aren't disclosed to this provider as they may interact with the regimen to the patient's detriment. The importance of adherence to the recommended treatment and interval follow-up appointments has been emphasized.     Education:  I have counseled Chersie with regard to diagnoses and the recommended treatment regimen as documented below. I have advised that because medications can elicit idiosyncratic reactions in different individuals that SE may present in ways that haven't been discussed. I have reiterated the importance of discussing with me any clinical changes that could represent potential adverse effects regarding the medication regimen.    Patient acknowledges the diagnoses per my rendered interpretation. Patient is agreeable to call 911 or go to the nearest ER should she become concerned for her own safety and/or the safety of those around her. Patient demonstrates understanding of potential risks/benefits/side effects associated with the recommended treatment regimen and is amenable to proceed in the fashion outlined below. Patient has been encouraged to cultivate constructive patterns of living including limiting daily caffeine intake, hydrating appropriately, regularly eating nutritious foods, engaging sleep hygiene practices, engaging in appropriate exposure to sunlight, engaging with hobbies in balance with life necessities, and exercising appropriate to their capacity to do so.    Risk:  There is no significant change to risk profile discernible during today's evaluation - do note that this is subject to change with the [employer] of new stressors, treatment non-adherence, use of substances, and/or new medical ails. There is no  appreciable evidence of intent for harm to self or others. There are no appreciable indices of jeanna/psychosis.    Contraception and mitigation of potential teratogenicity: I have advised there are risks taking any medication during pregnancy and, unfortunately, these risks are poorly elaborated due to ethical concerns with studying medications in pregnant women. I have advised that in the case of pregnancy risk may be beyond what I'm able to advise and the general approach is that medication use should only be considered if potential benefits outweigh the possibility of risks by the patient's measure.    Psychotherapy:  - Time: 25 minutes  - interventions employed: the therapeutic alliance was strengthened to encourage the patient to express their thoughts and feelings freely. Esteem building was enhanced through praise, reassurance, normalizing/challenging, and encouragement as appropriate. Coping skills were enhanced to build distress tolerance skills and emotional regulation. Allowed patient to freely discuss issues without interruption or judgement with unconditional positive regard, active listening skills, and empathy. Provided a safe, confidential environment to facilitate the development of a positive therapeutic relationship and encourage open, honest communication. Assisted patient in processing session content; acknowledged and normalized/addressed, as appropriate, patient’s thoughts, feelings, and concerns by utilizing a person-centered approach in efforts to build appropriate rapport and a positive therapeutic relationship with open and honest communication.   - Diagnoses: see assessment and plan below  - Symptoms: see subjective above  - Goals              - patient: mitigate anxiety, improve mood sustainably, adjust to new circumstances with zach              - provider: challenge patterns of living conducive to pathology, strengthen defenses, promote problems solving, restore adaptive functioning  and provide symptom relief.  - Treatment plan: continue supportive psychotherapy in subsequent appointments to provide symptom relief; see assessment and plan below for additional details:              - iteration: 1              - progress: moderate              - (X)illumination, (X)contextualization, (X)detection, (working)development, (working)elaboration, (-)refinement  - functional status: good  - mental status exam: as below  - prognosis: good     Psychiatric History:  - Psychotropic medication trials: paroxetine, bupropion, escitalopram, duloxetine, others; no issues with SE in the past, but instead diminished effectiveness  - Key synopsis: no hx SIB or SA     Substance Abuse History:   - Key synopsis: substances in high school as below, not since; stopped after discovering she was pregnant     Social History:  - Key synopsis: lives in an apartment with her daughter (17 YO) - daughter just recently moved in and there is conflict; patient moved from [place] 09/05/20[place] due to work transfer; divorce was three years ago; manager at [employer]; teacher in the past; AD in early childhood education; Congregational by ethnicity, doesn't like organized [employer]; has enjoyed sitting in [place] in the past; walks 7 miles on an average shift; often eats junk foods; 3-4 cups of coffee daily    Social History     Socioeconomic History    Marital status:    Tobacco Use    Smoking status: Every Day     Current packs/day: 1.00     Average packs/day: 1 pack/day for 26.0 years (26.0 ttl pk-yrs)     Types: Cigarettes    Smokeless tobacco: Never   Vaping Use    Vaping status: Never Used   Substance and Sexual Activity    Alcohol use: Never    Drug use: Not Currently     Types: Marijuana, Cocaine(coke), LSD, MDMA (ecstacy), Opium, Psilocybin     Comment: Experimented in high school    Sexual activity: Yes     Partners: Male     Birth control/protection: None     Tobacco use counseling/intervention: patient has been counseled  with regard to risks of tobacco use and encouraged to consider quitting, with or without the use of adjunctive medication, by first setting a prospective quit date. Currently precontemplative by transtheoretical model.     PHQ-9 Depression Screening  PHQ-9 Total Score:  11    Little interest or pleasure in doing things? Over half   Feeling down, depressed, or hopeless? Several days   PHQ-2 Total Score 3   Trouble falling or staying asleep, or sleeping too much? Over half   Feeling tired or having little energy? Over half   Poor appetite or overeating? Over half   Feeling bad about yourself - or that you are a failure or have let yourself or your family down? Several days   Trouble concentrating on things, such as reading the newspaper or watching television? Not at all   Moving or speaking so slowly that other people could have noticed? Or the opposite - being so fidgety or restless that you have been moving around a lot more than usual? Not at all   Thoughts that you would be better off dead, or of hurting yourself in some way? Several days   PHQ-9 Total Score 11   If you checked off any problems, how difficult have these problems made it for you to do your work, take care of things at home, or get along with other people? Not difficult at all     Change in PHQ-9 since last measure: -3 (14)    MARTA-7  Feeling nervous, anxious or on edge: Several days  Not being able to stop or control worrying: Several days  Worrying too much about different things: Several days  Trouble Relaxing: Several days  Being so restless that it is hard to sit still: Not at all  Feeling afraid as if something awful might happen: Not at all  Becoming easily annoyed or irritable: Not at all  MARTA 7 Total Score: 4  If you checked any problems, how difficult have these problems made it for you to do your work, take care of things at home, or get along with other people: Not difficult at all    Change in MARTA-7 since last measure: N/A  (4)    Problem List:  Patient Active Problem List   Diagnosis    Recurrent depressive disorder, current episode mild    MARTA (generalized anxiety disorder)    Panic disorder    Benzodiazepine dependence    Nicotine dependence, cigarettes, uncomplicated    Cervical spondylosis without myelopathy     Allergy:   Allergies   Allergen Reactions    Amoxicillin-Pot Clavulanate Diarrhea      Discontinued Medications:  Medications Discontinued During This Encounter   Medication Reason    guaifenesin-dextromethorphan 600-30 mg (MUCINEX DM)  MG tablet sustained-release 12 hour     ibuprofen (ADVIL,MOTRIN) 800 MG tablet     albuterol (PROVENTIL) (2.5 MG/3ML) 0.083% nebulizer solution     sertraline (ZOLOFT) 100 MG tablet Reorder     Current Medications:   Current Outpatient Medications   Medication Sig Dispense Refill    clonazePAM (KlonoPIN) 1 MG tablet Take 1 tablet by mouth 2 (Two) Times a Day. 60 tablet 2    sertraline (ZOLOFT) 100 MG tablet Take 1 tablet by mouth Every Night. 90 tablet 0     No current facility-administered medications for this visit.     Past Medical History:  Past Medical History:   Diagnosis Date    Anxiety 1997    Depression 1997    Panic disorder 1997     Past Surgical History:  Past Surgical History:   Procedure Laterality Date    NO PAST SURGERIES       Mental Status Exam:   Appearance: well-groomed, sits upright, age-appropriate, above average habitus  Behavior: calm, cooperative, appropriate in demeanor, appropriate eye-contact  Mood/affect: fair / mood-congruent and appropriate in both range and amplitude  Speech: within expected variance; appropriate rate, appropriate rhythm, appropriate tone; non-pressured  Thought Process: linear, goal-directed; no FOI or JOAN; abstraction intact  Thought Content: coherent, devoid of overt delusions/perceptual disturbances  SI/HI: denies both SI and HI; exhibits future-orientation, self-advocates appropriately, no regular self-harm, no appreciable  "intent  Memory: no overt deficits  Orientation: oriented to person/place/time/situation  Concentration: appropriate during interview  Intellectual capacity: presumptively average  Insight: fair by given history/exam  Judgment: appropriate by given history/exam  Psychomotor: no appreciable latency/retardation/agitation/tremor  Gait: WNL    Review of Systems:  Review of Systems   Constitutional:  Negative for activity change, appetite change and unexpected weight change.   HENT:  Negative for drooling.    Eyes:  Negative for visual disturbance.   Respiratory:  Negative for chest tightness and shortness of breath.    Cardiovascular:  Negative for chest pain and palpitations.   Gastrointestinal:  Negative for abdominal pain, diarrhea and nausea.   Endocrine: Negative for cold intolerance and heat intolerance.   Genitourinary:  Negative for difficulty urinating and frequency.   Musculoskeletal:  Negative for myalgias and neck stiffness.   Skin:  Negative for rash.   Neurological:  Negative for dizziness, tremors, seizures and light-headedness.   Psychiatric/Behavioral:  Negative for agitation and sleep disturbance.      Vital Signs:   /84   Pulse 90   Ht 170.2 cm (67\")   Wt 118 kg (260 lb 6.4 oz)   BMI 40.78 kg/m²      Lab Results:   Admission on 12/14/2024, Discharged on 12/14/2024   Component Date Value Ref Range Status    SARS Antigen 12/14/2024 Not Detected  Not Detected, Presumptive Negative Final    Internal Control 12/14/2024 Passed  Passed Final    Lot Number 12/14/2024 [id number]   Final    Expiration Date 12/14/2024 06/18/2025   Final    Rapid Influenza A Ag 12/14/2024 Negative  Negative Final    Rapid Influenza B Ag 12/14/2024 Negative  Negative Final    Internal Control 12/14/2024 Passed  Passed Final    Lot Number 12/14/2024 [id number]   Final    Expiration Date 12/14/2024 10/11/2026   Final     EKG Results:  No orders to display     Imaging Results:  XR Chest 2 View  Result Date: " 12/14/2024  Impression: No active pulmonary process. Electronically Signed: Fam Post MD      ASSESSMENT AND PLAN:    ICD-10-CM ICD-9-CM   1. MARTA (generalized anxiety disorder)  F41.1 300.02   2. Panic disorder  F41.0 300.01   3. Major depressive disorder, recurrent episode, in partial remission  F33.41 296.35     44 y.o. female who presents today for follow-up. We have discussed the interval history and the treatment plan below:      Medication regimen: no change - continue sertraline and clonazepam   Monitoring: reviewed labs/imaging as populated above  Primary psychotherapy: deferred  Follow-up: 3 months  Communications: N/A  Treatment plan: due    TREATMENT PLAN/GOALS: challenge patterns of living conducive to symptom burden, implement recommended regimen as above with augmentative, intermittent supportive psychotherapy to reduce symptom burden. Patient acknowledged and verbally consented to continue treatment.      Billing: This encounter is of moderate complexity based on number/complexity of problems addressed today and risk of complications/morbidity: 2+ stable chronic illnesses and and prescription management. Additionally, I provided 25 minutes of dedicated psychotherapy to the patient, distinct from E/M services, as documented above. Start time: 1217. Stop time: 1242.     Electronically signed by Wang Osman MD, 02/13/25, [id number]

## 2025-02-13 NOTE — TREATMENT PLAN
Multi-Disciplinary Problems (from Behavioral Health Treatment Plan)      Active Problems       Problem:  Patient Care Overview (Adult)  Start Date: 02/13/25      Problem Details: Treatment Planning for Cherise    Applicable diagnoses/problems:    - Depression: enhance motivation and interest with regard to ego-syntonic items of living via routine building and disruption of inhibitory executive cognitive circuits; challenge withdrawal from ego-syntonic items of living; cultivate sydney and satisfaction via a consistent practice of appreciation; consistently practice redirection from intrusive ego-dystonic thoughts towards actionable items to reduce influence these thoughts have in emotions and behavior.  - progress: moderate, progressing  - frequency of intervention: 4-8 weeks as scheduling allows  - duration of treatment: until optimized functional status is met    - Anxiety: enhance engagement with regard to ego-syntonic items of living via routine building and disruption of inhibitory executive cognitive circuits; challenge avoidance of ego-syntonic items of living via disruption to perceived barriers; reduce salience of fears and overwhelm with regard to their effects on thinking and behavior.  - progress: good, progressing  - frequency of intervention: 4-8 weeks as scheduling allows  - duration of treatment: until optimized functional status is met        Goal Priority Start Date Expected End Date End Date    Plan of Care Review -- 02/13/25 08/14/25 --

## 2025-05-15 DIAGNOSIS — F13.20 BENZODIAZEPINE DEPENDENCE: ICD-10-CM

## 2025-05-15 DIAGNOSIS — F41.1 GAD (GENERALIZED ANXIETY DISORDER): ICD-10-CM

## 2025-05-15 DIAGNOSIS — F41.0 PANIC DISORDER: ICD-10-CM

## 2025-05-15 RX ORDER — CLONAZEPAM 1 MG/1
1 TABLET ORAL 2 TIMES DAILY
Qty: 60 TABLET | Refills: 0 | Status: SHIPPED | OUTPATIENT
Start: 2025-05-15

## 2025-05-15 NOTE — TELEPHONE ENCOUNTER
REFILL REQUEST:    clonazePAM (KlonoPIN) 1 MG tablet (12/03/2024)     F/UP: 06/26/2025.  LOV: 02/13/2025.

## 2025-05-15 NOTE — TELEPHONE ENCOUNTER
Pt came into office to r/s a follow up appt and while she was here she said that she will need a refill, on clonazePAM (KlonoPIN) 1 MG tablet before next scheduled follow up. Would like call regarding this thank you.

## 2025-08-05 DIAGNOSIS — F13.20 BENZODIAZEPINE DEPENDENCE: ICD-10-CM

## 2025-08-05 DIAGNOSIS — F41.0 PANIC DISORDER: ICD-10-CM

## 2025-08-05 DIAGNOSIS — Z51.81 THERAPEUTIC DRUG MONITORING: Primary | ICD-10-CM

## 2025-08-05 DIAGNOSIS — F41.1 GAD (GENERALIZED ANXIETY DISORDER): ICD-10-CM

## 2025-08-05 RX ORDER — CLONAZEPAM 1 MG/1
1 TABLET ORAL 2 TIMES DAILY
Qty: 60 TABLET | Refills: 1 | Status: SHIPPED | OUTPATIENT
Start: 2025-08-05

## 2025-08-12 ENCOUNTER — OFFICE VISIT (OUTPATIENT)
Dept: PSYCHIATRY | Facility: CLINIC | Age: 45
End: 2025-08-12
Payer: COMMERCIAL

## 2025-08-12 VITALS
BODY MASS INDEX: 37.67 KG/M2 | DIASTOLIC BLOOD PRESSURE: 91 MMHG | HEART RATE: 76 BPM | HEIGHT: 67 IN | SYSTOLIC BLOOD PRESSURE: 132 MMHG | OXYGEN SATURATION: 97 % | WEIGHT: 240 LBS

## 2025-08-12 DIAGNOSIS — F41.0 PANIC DISORDER: ICD-10-CM

## 2025-08-12 DIAGNOSIS — F13.20 BENZODIAZEPINE DEPENDENCE: ICD-10-CM

## 2025-08-12 DIAGNOSIS — F33.42 MAJOR DEPRESSION, RECURRENT, FULL REMISSION: ICD-10-CM

## 2025-08-12 DIAGNOSIS — F41.1 GAD (GENERALIZED ANXIETY DISORDER): Primary | ICD-10-CM

## 2025-08-12 RX ORDER — SERTRALINE HYDROCHLORIDE 100 MG/1
100 TABLET, FILM COATED ORAL NIGHTLY
Qty: 90 TABLET | Refills: 0 | Status: SHIPPED | OUTPATIENT
Start: 2025-08-12

## 2025-08-12 RX ORDER — CLONAZEPAM 1 MG/1
1 TABLET ORAL 2 TIMES DAILY
Qty: 60 TABLET | Refills: 0 | Status: SHIPPED | OUTPATIENT
Start: 2025-08-22